# Patient Record
Sex: FEMALE | Race: WHITE | Employment: OTHER | URBAN - METROPOLITAN AREA
[De-identification: names, ages, dates, MRNs, and addresses within clinical notes are randomized per-mention and may not be internally consistent; named-entity substitution may affect disease eponyms.]

---

## 2022-04-27 ENCOUNTER — APPOINTMENT (OUTPATIENT)
Dept: CT IMAGING | Age: 57
DRG: 057 | End: 2022-04-27
Attending: EMERGENCY MEDICINE
Payer: COMMERCIAL

## 2022-04-27 ENCOUNTER — HOSPITAL ENCOUNTER (INPATIENT)
Age: 57
LOS: 1 days | Discharge: HOME | DRG: 057 | End: 2022-04-27
Attending: EMERGENCY MEDICINE | Admitting: INTERNAL MEDICINE
Payer: COMMERCIAL

## 2022-04-27 VITALS
HEART RATE: 108 BPM | RESPIRATION RATE: 18 BRPM | SYSTOLIC BLOOD PRESSURE: 161 MMHG | DIASTOLIC BLOOD PRESSURE: 68 MMHG | OXYGEN SATURATION: 99 %

## 2022-04-27 DIAGNOSIS — Z86.79 HISTORY OF ATRIAL FIBRILLATION: ICD-10-CM

## 2022-04-27 DIAGNOSIS — R51.9 ACUTE NONINTRACTABLE HEADACHE, UNSPECIFIED HEADACHE TYPE: ICD-10-CM

## 2022-04-27 DIAGNOSIS — R29.898 WEAKNESS OF EXTREMITY: ICD-10-CM

## 2022-04-27 DIAGNOSIS — Z86.2 HISTORY OF LUPUS ANTICOAGULANT DISORDER: ICD-10-CM

## 2022-04-27 DIAGNOSIS — R42 VERTIGO: Primary | ICD-10-CM

## 2022-04-27 PROBLEM — R53.1 RIGHT SIDED WEAKNESS: Status: ACTIVE | Noted: 2022-04-27

## 2022-04-27 LAB
ALBUMIN SERPL-MCNC: 3.9 G/DL (ref 3.4–5)
ALBUMIN/GLOB SERPL: 1 {RATIO} (ref 0.8–1.7)
ALP SERPL-CCNC: 226 U/L (ref 45–117)
ALT SERPL-CCNC: 84 U/L (ref 13–56)
AMMONIA PLAS-SCNC: 13 UMOL/L (ref 11–32)
AMPHET UR QL SCN: NEGATIVE
ANION GAP SERPL CALC-SCNC: 10 MMOL/L (ref 3–18)
APPEARANCE UR: CLEAR
AST SERPL-CCNC: 40 U/L (ref 10–38)
BARBITURATES UR QL SCN: NEGATIVE
BASOPHILS # BLD: 0 K/UL (ref 0–0.1)
BASOPHILS NFR BLD: 0 % (ref 0–2)
BENZODIAZ UR QL: NEGATIVE
BILIRUB DIRECT SERPL-MCNC: 0.2 MG/DL (ref 0–0.2)
BILIRUB SERPL-MCNC: 0.6 MG/DL (ref 0.2–1)
BILIRUB UR QL: NEGATIVE
BUN SERPL-MCNC: 13 MG/DL (ref 7–18)
BUN/CREAT SERPL: 16 (ref 12–20)
CALCIUM SERPL-MCNC: 9.7 MG/DL (ref 8.5–10.1)
CANNABINOIDS UR QL SCN: NEGATIVE
CHLORIDE SERPL-SCNC: 102 MMOL/L (ref 100–111)
CO2 SERPL-SCNC: 26 MMOL/L (ref 21–32)
COCAINE UR QL SCN: NEGATIVE
COLOR UR: YELLOW
CREAT SERPL-MCNC: 0.83 MG/DL (ref 0.6–1.3)
DIFFERENTIAL METHOD BLD: ABNORMAL
EOSINOPHIL # BLD: 0 K/UL (ref 0–0.4)
EOSINOPHIL NFR BLD: 0 % (ref 0–5)
ERYTHROCYTE [DISTWIDTH] IN BLOOD BY AUTOMATED COUNT: 13.1 % (ref 11.6–14.5)
ETHANOL SERPL-MCNC: 6 MG/DL (ref 0–3)
GLOBULIN SER CALC-MCNC: 3.9 G/DL (ref 2–4)
GLUCOSE BLD STRIP.AUTO-MCNC: 306 MG/DL (ref 70–110)
GLUCOSE SERPL-MCNC: 335 MG/DL (ref 74–99)
GLUCOSE UR STRIP.AUTO-MCNC: >1000 MG/DL
HCT VFR BLD AUTO: 50.8 % (ref 35–45)
HDSCOM,HDSCOM: NORMAL
HGB BLD-MCNC: 17 G/DL (ref 12–16)
HGB UR QL STRIP: NEGATIVE
IMM GRANULOCYTES # BLD AUTO: 0.1 K/UL (ref 0–0.04)
IMM GRANULOCYTES NFR BLD AUTO: 1 % (ref 0–0.5)
INR PPP: 1 (ref 0.8–1.2)
KETONES UR QL STRIP.AUTO: ABNORMAL MG/DL
LEUKOCYTE ESTERASE UR QL STRIP.AUTO: NEGATIVE
LYMPHOCYTES # BLD: 1.5 K/UL (ref 0.9–3.6)
LYMPHOCYTES NFR BLD: 17 % (ref 21–52)
MCH RBC QN AUTO: 29 PG (ref 24–34)
MCHC RBC AUTO-ENTMCNC: 33.5 G/DL (ref 31–37)
MCV RBC AUTO: 86.7 FL (ref 78–100)
METHADONE UR QL: NEGATIVE
MONOCYTES # BLD: 0.5 K/UL (ref 0.05–1.2)
MONOCYTES NFR BLD: 5 % (ref 3–10)
NEUTS SEG # BLD: 6.8 K/UL (ref 1.8–8)
NEUTS SEG NFR BLD: 77 % (ref 40–73)
NITRITE UR QL STRIP.AUTO: NEGATIVE
NRBC # BLD: 0 K/UL (ref 0–0.01)
NRBC BLD-RTO: 0 PER 100 WBC
OPIATES UR QL: NEGATIVE
PCP UR QL: NEGATIVE
PH UR STRIP: 5 [PH] (ref 5–8)
PLATELET # BLD AUTO: 230 K/UL (ref 135–420)
PMV BLD AUTO: 11 FL (ref 9.2–11.8)
POTASSIUM SERPL-SCNC: 3.6 MMOL/L (ref 3.5–5.5)
PROT SERPL-MCNC: 7.8 G/DL (ref 6.4–8.2)
PROT UR STRIP-MCNC: NEGATIVE MG/DL
PROTHROMBIN TIME: 13.4 SEC (ref 11.5–15.2)
RBC # BLD AUTO: 5.86 M/UL (ref 4.2–5.3)
SODIUM SERPL-SCNC: 138 MMOL/L (ref 136–145)
SP GR UR REFRACTOMETRY: >1.03 (ref 1–1.03)
TROPONIN-HIGH SENSITIVITY: 5 NG/L (ref 0–54)
TROPONIN-HIGH SENSITIVITY: 5 NG/L (ref 0–54)
UROBILINOGEN UR QL STRIP.AUTO: 1 EU/DL (ref 0.2–1)
WBC # BLD AUTO: 8.8 K/UL (ref 4.6–13.2)

## 2022-04-27 PROCEDURE — 74011250636 HC RX REV CODE- 250/636: Performed by: EMERGENCY MEDICINE

## 2022-04-27 PROCEDURE — 65270000046 HC RM TELEMETRY

## 2022-04-27 PROCEDURE — 70496 CT ANGIOGRAPHY HEAD: CPT

## 2022-04-27 PROCEDURE — 81003 URINALYSIS AUTO W/O SCOPE: CPT

## 2022-04-27 PROCEDURE — 80307 DRUG TEST PRSMV CHEM ANLYZR: CPT

## 2022-04-27 PROCEDURE — 93005 ELECTROCARDIOGRAM TRACING: CPT

## 2022-04-27 PROCEDURE — 82077 ASSAY SPEC XCP UR&BREATH IA: CPT

## 2022-04-27 PROCEDURE — 80048 BASIC METABOLIC PNL TOTAL CA: CPT

## 2022-04-27 PROCEDURE — 85025 COMPLETE CBC W/AUTO DIFF WBC: CPT

## 2022-04-27 PROCEDURE — 99285 EMERGENCY DEPT VISIT HI MDM: CPT

## 2022-04-27 PROCEDURE — 70450 CT HEAD/BRAIN W/O DYE: CPT

## 2022-04-27 PROCEDURE — 74011250637 HC RX REV CODE- 250/637: Performed by: EMERGENCY MEDICINE

## 2022-04-27 PROCEDURE — 96360 HYDRATION IV INFUSION INIT: CPT

## 2022-04-27 PROCEDURE — 80076 HEPATIC FUNCTION PANEL: CPT

## 2022-04-27 PROCEDURE — 84484 ASSAY OF TROPONIN QUANT: CPT

## 2022-04-27 PROCEDURE — 82962 GLUCOSE BLOOD TEST: CPT

## 2022-04-27 PROCEDURE — 85610 PROTHROMBIN TIME: CPT

## 2022-04-27 PROCEDURE — 74011000636 HC RX REV CODE- 636: Performed by: EMERGENCY MEDICINE

## 2022-04-27 PROCEDURE — 82140 ASSAY OF AMMONIA: CPT

## 2022-04-27 RX ORDER — DIPHENHYDRAMINE HYDROCHLORIDE 50 MG/ML
25 INJECTION, SOLUTION INTRAMUSCULAR; INTRAVENOUS ONCE
Status: COMPLETED | OUTPATIENT
Start: 2022-04-27 | End: 2022-04-27

## 2022-04-27 RX ORDER — METOCLOPRAMIDE HYDROCHLORIDE 5 MG/ML
5 INJECTION INTRAMUSCULAR; INTRAVENOUS ONCE
Status: COMPLETED | OUTPATIENT
Start: 2022-04-27 | End: 2022-04-27

## 2022-04-27 RX ORDER — MECLIZINE HYDROCHLORIDE 25 MG/1
25 TABLET ORAL
Qty: 20 TABLET | Refills: 0 | Status: SHIPPED | OUTPATIENT
Start: 2022-04-27 | End: 2022-05-04

## 2022-04-27 RX ORDER — MECLIZINE HCL 12.5 MG 12.5 MG/1
25 TABLET ORAL
Status: COMPLETED | OUTPATIENT
Start: 2022-04-27 | End: 2022-04-27

## 2022-04-27 RX ORDER — GUAIFENESIN 100 MG/5ML
324 LIQUID (ML) ORAL
Status: COMPLETED | OUTPATIENT
Start: 2022-04-27 | End: 2022-04-27

## 2022-04-27 RX ADMIN — METOCLOPRAMIDE HYDROCHLORIDE 5 MG: 5 INJECTION INTRAMUSCULAR; INTRAVENOUS at 21:55

## 2022-04-27 RX ADMIN — SODIUM CHLORIDE 1000 ML: 900 INJECTION, SOLUTION INTRAVENOUS at 19:52

## 2022-04-27 RX ADMIN — IOPAMIDOL 80 ML: 755 INJECTION, SOLUTION INTRAVENOUS at 19:02

## 2022-04-27 RX ADMIN — DIPHENHYDRAMINE HYDROCHLORIDE 25 MG: 50 INJECTION, SOLUTION INTRAMUSCULAR; INTRAVENOUS at 21:56

## 2022-04-27 RX ADMIN — MECLIZINE 25 MG: 12.5 TABLET ORAL at 21:55

## 2022-04-27 RX ADMIN — ASPIRIN 81 MG 324 MG: 81 TABLET ORAL at 21:55

## 2022-04-27 NOTE — Clinical Note
Status[de-identified] INPATIENT [101]   Type of Bed: Telemetry [19]   Cardiac Monitoring Required?: Yes   Inpatient Hospitalization Certified Necessary for the Following Reasons: 3.  Patient receiving treatment that can only be provided in an inpatient setting (further clarification in H&P documentation)   Admitting Diagnosis: Right sided weakness [2596533]   Admitting Diagnosis: Vertigo [755641]   Admitting Diagnosis: Headache [[id number]]   Admitting Diagnosis: History of lupus anticoagulant disorder [4421197]   Admitting Diagnosis: History of atrial fibrillation [3069798]   Admitting Physician: Teagan Montano   Attending Physician: Teagan Montano   Estimated Length of Stay: 2 Midnights   Discharge Plan[de-identified] 2003 [facility]

## 2022-04-27 NOTE — ED PROVIDER NOTES
EMERGENCY DEPARTMENT HISTORY AND PHYSICAL EXAM    6:50 PM    Date: 4/27/2022  Patient Name: Lor Kent    History of Presenting Illness     Chief Complaint   Patient presents with    Extremity Weakness       History Provided By: Patient  Location/Duration/Severity/Modifying factors   Patient is a 50-year-old female with past medical history of atrial fibrillation, previous stroke, diabetes, full past medical history limited due to the acuity of condition and no access to outside records at this time. She presents with complaints of right-sided facial arm and leg weakness patient reportedly onset 1 hour ago. The patient reports she had 2 strokes in the past, most recently was at least 5 or 6 years ago. She denies any chest pain or shortness of breath. History limited due to the acuity of condition and rapidity of assessment. Patient indicates that she initially felt \"off\", prior to developing the focal weakness on the right side. She mentioned it to her daughter And her daughter's  who is reportedly a physician and they thought possibly it was related to dehydration. After realizing the focal weakness on the right side they recommend the patient come to be evaluated. Patient reports compliance with her Eliquis, she has a history of A. fib.           PCP: None        Past History     Past Medical History:  Past Medical History:   Diagnosis Date    Atrial fibrillation ([address]     Claustrophobia 04/27/2022    Patient requesting Full Sedation for MRI    CVA (cerebral vascular accident) ([address]     Diabetes mellitus, type 2 ([address]     Family history of early CAD     Father at 40, Brother at 44    GERD (gastroesophageal reflux disease)     Goiter     H/O lupus anticoagulant disorder     HLD (hyperlipidemia)     HTN (hypertension)     Migraines     Reportedly without Aura    THOMPSON (nonalcoholic steatohepatitis)     Sjogren's syndrome ([address]        Past Surgical History:  Past Surgical History:   Procedure Laterality Date    HX AFIB ABLATION  2014    Cardiac Ablation and Loop Recorder Implantation    HX HYSTERECTOMY      HX OTHER SURGICAL      Breast Sx    IR BX LIVER PERCUTANEOUS         Family History:  Family History   Problem Relation Age of Onset    COPD Mother     Coronary Art Dis Father 40        Early CAD   [place] Stroke Sister     Coronary Art Dis Sister     Stroke Brother     Coronary Art Dis Brother 44        Early CAD       Social History:  Social History     Tobacco Use    Smoking status: Not on file    Smokeless tobacco: Not on file   Substance Use Topics    Alcohol use: Not Currently    Drug use: Not on file       Allergies: Allergies   Allergen Reactions    Coumadin [Warfarin] Unknown (comments)    Lovenox [Enoxaparin] Rash    Xarelto [Rivaroxaban] Unknown (comments)       I reviewed and confirmed the above information with patient and updated as necessary. Review of Systems     Review of Systems   Constitutional: Negative for chills and fever. HENT: Negative for congestion, rhinorrhea, sinus pressure and sneezing. Eyes: Negative for visual disturbance. Respiratory: Negative for cough and shortness of breath. Cardiovascular: Negative for chest pain. Gastrointestinal: Negative for abdominal pain, diarrhea, nausea and vomiting. Genitourinary: Negative for dysuria, frequency and urgency. Musculoskeletal: Negative for back pain and neck pain. Skin: Negative for rash. Neurological: Positive for dizziness, weakness, numbness and headaches. Negative for syncope. Physical Exam     Visit Vitals  BP (!) 161/68   Pulse (!) 108   Resp 18   SpO2 99%       Physical Exam  Vitals and nursing note reviewed. Constitutional:       General: She is not in acute distress. Appearance: Normal appearance. She is obese. HENT:      Head: Normocephalic and atraumatic.       Right Ear: External ear normal.      Left Ear: External ear normal.      Nose: Nose normal.      Mouth/Throat:      Mouth: Mucous membranes are moist.   Eyes:      Conjunctiva/sclera: Conjunctivae normal.      Pupils: Pupils are equal, round, and reactive to light. Comments: No vertical or rotatory nystagmus   Cardiovascular:      Rate and Rhythm: Normal rate and regular rhythm. Pulses: Normal pulses. Heart sounds: Normal heart sounds. No murmur heard. Pulmonary:      Effort: Pulmonary effort is normal.      Breath sounds: Normal breath sounds. No wheezing, rhonchi or rales. Abdominal:      General: Abdomen is flat. Tenderness: There is no abdominal tenderness. There is no guarding or rebound. Musculoskeletal:         General: No swelling or tenderness. Normal range of motion. Cervical back: Normal range of motion and neck supple. Right lower leg: No edema. Left lower leg: No edema. Skin:     General: Skin is warm and dry. Capillary Refill: Capillary refill takes less than 2 seconds. Findings: No rash. Neurological:      Mental Status: She is alert. Comments: Cranial nerves II through XII are intact. Face looks mildly asymmetric although she has symmetric smile and preserved strength with a midline tongue protrusion. She has no objective loss of sensation or strength in the upper or lower extremities. Diagnostic Study Results     Labs -  No results found for this or any previous visit (from the past 24 hour(s)). Radiologic Studies -   CTA HEAD NECK W CONT   Final Result   1. Patent intracranial brain arteries. No LVO. 2. No significant stenosis in cervical carotid or vertebral arteries. 3. Short segment of irregular contours in the cervical left ICA below the skull   base. No luminal narrowing, but the beaded appearance could represent   fibromuscular dysplasia or possibly a chronic dissection.  The right cervical ICA   contours are normal.          CT HEAD WO CONT   Final Result   Addendum 1 of 1   Addendum: Wet read provided via facilitator to Dr. Michelle Ovalles at 18:59. Final                  No acute intracranial abnormalities. Medical Decision Making   I am the first provider for this patient. I reviewed the vital signs, available nursing notes, past medical history, past surgical history, family history and social history. Vital Signs-Reviewed the patient's vital signs. EKG: Normal sinus rhythm, rate of 95. Normal VT, QRS and QTc intervals. Normal axis. No ST segment elevation or depression. Overall normal sinus rhythm and normal EKG. Records Reviewed: Nursing Notes and Old Medical Records (Time of Review: 6:50 PM)      Provider Notes (Medical Decision Making):   MDM  Number of Diagnoses or Management Options  Acute nonintractable headache, unspecified headache type  History of atrial fibrillation  History of lupus anticoagulant disorder  Vertigo  Weakness of extremity  Diagnosis management comments: 24-year-old female who presents to the emergency room with a chief complaint of dizziness, headache and right-sided primarily objective weakness. Exam she does not have any objective neurologic findings, her face looks mildly asymmetric although she does not really have any loss of strength in her cranial nerves on formal testing. Patient's daughter does not feel that her face looks different than usual.    Patient was called down as acute stroke, given time of onset within 24 hours of arrival, patient not a tPA candidate due to her Eliquis which she has been taking and patient excluded from alteplase for that reason. Teleneurology evaluated the patient thought this is more likely vertigo than stroke, they requested MRI be performed.     DX: Acute stroke, vertigo, migraine, functional neurologic symptom disorder, lupus, dural sinus thrombosis, intracranial hemorrhage, cerebellar stroke, BPPV, etc.    Telemetry neurologist who saw the patient requested that MRI be done to rule out stroke, felt was more likely BPPV. Advised that if MRI was negative basically follow closely can be discharged. The patient was adamant that she could not have MRI performed without being fully sedated. Unfortunately do not have the capability here to fully sedate the patient for MRI. Given limitations of single coverage facility with freestanding emergency room capabilities we cannot sedate the patient, more than anxiolysis. Which the patient indicated would not be acceptable for her. I recommended in that case the patient to be admitted for sedation MRI or serial exams and serial CT scans to rule out stroke. Patient initially agreeable however became opposed to admission. I offered that we could do a lateral transfer to an alternative facility if she was going to go to [facility]. She took some time to think about it although ultimately declined admission, she would rather do outpatient follow-up. Patient was feeling somewhat better. I will prescribe her meclizine, discussed with her the reasoning and rationale for admission which would include serial exams, neurologic evaluation, possibly sedation MRI and risk factor modification for stroke was discovered. We are able to rule out large vessel occlusion, although additionally an echocardiogram would typically be recommended to be performed as well as assessment of her anticoagulated status. She does not currently have any outpatient PCP coverage. She indicates that she would like resources on give her as much as we can reasonably give her here in the ER, it is late in the evening most offices are closed however advised her to call tomorrow morning first thing I will give her neurology follow-up. Evidently her son-in-law is also physician. I encouraged the patient to return if worsening in any way in the meantime or if she has recurrent symptoms or any worsening in her condition in the meantime or if she reconsiders.     This note is dictated utilizing Jamm[employer] voice recognition software. Unfortunately this leads to occasional typographical errors using the voice recognition. I apologize in advance if the situation occurs. If questions occur please do not hesitate to contact me directly. Dee Avalos DO            ED Course: Progress Notes, Reevaluation, and Consults:  ED Course as of 04/29/22 1221   Wed Apr 27, 2022   1847 This with teleneurology, they will evaluate the patient upon return from CT scan. [DRAKE]   1945 Discussed with teleneurology, thought possibly related to vertigo, but given the patient's risk factors with A. fib, reported weakness, suggested MRI and stroke work-up. Unfortunately patient is too claustrophobic to get an MRI here without full sedation we do not have the capability to do a full sedation in our MRI suite at our freestanding emergency room. [DRAKE]   2155 After I spoke to the hospitalist to arrange for admission for the patient and she indicated that she decided not to be admitted. I discussed with her the expectations for what would happen while admitted including respect modification, MRI with sedation, as well as echocardiogram, neurology evaluation and cardiac evaluation as well as optimization of anticoagulation. The patient acknowledges this and her daughter they would prefer to go follow-up outpatient. I do not think this is incredibly unreasonable, as patient's symptoms are primarily subjective, rapidly improving and teleneurology felt like this was possibly even more likely vertigo than ischemic stroke. Patient advised to follow closely with outpatient resources. Patient was previously taking Xigduo for diabetes. I offered to represcribe to her however she indicates she has some at home and will resume taking them. [DRAKE]   6452 Preliminary results of no LVO on CTA head and neck although no full interpretation.  [DRAKE]      ED Course User Index  [DRAKE] Savi Bone DO       Procedures    Critical Care Time: CRITICAL CARE NOTE:    I have spent 38 minutes of critical care time involved in lab review, consultations with specialist, family decision-making, and documentation. During this entire length of time I was immediately available to the patient. Critical Care: The reason for providing this level of medical care for this critically ill patient was due a critical illness that impaired one or more vital organ systems such that there was a high probability of imminent or life threatening deterioration in the patients condition. This care involved high complexity decision making to assess, manipulate, and support vital system functions, to treat this vital organ system failure and to prevent further life threatening deterioration of the patients condition. Time is exclusive of procedural and teaching time. Arnoldo Ramsay DO      Diagnosis     Clinical Impression:   1. Vertigo    2. Acute nonintractable headache, unspecified headache type    3. History of atrial fibrillation    4. History of lupus anticoagulant disorder    5.  Weakness of extremity        Disposition: Discharged      Follow-up Information     Follow up With Specialties Details Why Contact Info    Gee Padilla MD Neurology Call in 2 days Neurology Follow up [address] [facility]      Perla Mcclellan MD Neurology Call in 1 day Neurology Follow up [address]  [phone]      [facility]  In 3 days [address]    Norris Kinsey MD Family Medicine  PCP [address] [place] [address]  [phone]      [facility] HOSP AT [facility] Family Medicine   Primary Care Resource [address] [facility]  Floor 3  [address]  In 3 days Primary Care Resource [facility] Jeanna Omalley MD Internal Medicine In 3 days PCP resource [address] [facility]  [phone]      Marques Gil MD Family Medicine Call in 1 day  [facility] [address] [phone]      Billy Durán MD Neurology Call in 1 day Neurology Follow up [address]  [phone]      Fab Palumbo MD Cardiology, Internal Medicine, Nuclear Medicine Call today Cardiology [facility]  [address] [address] [facility]      Karely Balderas MD Cardiology, Internal Medicine Call today Cardiology [address] [facility] [address]      Eleanor Javed MD Internal Medicine  PCP [address] [facility] [address]  [phone]      Nabeel Do MD Cardiology Call today Cardiology [address] [facility] Floor [address] [facility] [address]             Discharge Medication List as of 4/27/2022 10:09 PM      START taking these medications    Details   meclizine (ANTIVERT) 25 mg tablet Take 1 Tablet by mouth three (3) times daily as needed for Dizziness for up to 7 days. , Normal, Disp-20 Tablet, R-0             [address] [facility]   Emergency Medicine   April 29, 2022, 6:50 PM     This note is dictated utilizing Dragon voice recognition software. Unfortunately this leads to occasional typographical errors using the voice recognition. I apologize in advance if the situation occurs. If questions occur please do not hesitate to contact me directly. Patient was seen  and treated during the context of the COVID-19 pandemic. Contemporary protocols utilized based on the best available evidence, utilizing evolving public health  guidelines and treatment protocols.     Monica Able, DO]

## 2022-04-27 NOTE — ED TRIAGE NOTES
Pt c/o of right sided weakness. Pt states she was last normal at 1730 today. Pt states she has a history of stroke & afib & is taking eliquis.

## 2022-04-28 LAB
ATRIAL RATE: 95 BPM
CALCULATED P AXIS, ECG09: 35 DEGREES
CALCULATED R AXIS, ECG10: -3 DEGREES
CALCULATED T AXIS, ECG11: 31 DEGREES
DIAGNOSIS, 93000: NORMAL
P-R INTERVAL, ECG05: 204 MS
Q-T INTERVAL, ECG07: 368 MS
QRS DURATION, ECG06: 86 MS
QTC CALCULATION (BEZET), ECG08: 462 MS
VENTRICULAR RATE, ECG03: 95 BPM

## 2022-04-28 NOTE — ED NOTES
IVs removed with catheter intact. Discharge instructions and prescription reviewed with patient and understanding verbalized. Patient exits through waiting area.

## 2022-04-28 NOTE — PROGRESS NOTES
INTERIM UPDATE - 2047 EST on 4/27/2022    [facility] ER Physician calls requesting admission for a Patient with Right-Sided Weakness. Patient presented with 1-hour of right-sided weakness but was found to be ineligible for tPA, as Patient is on Apixaban. CT and CTA are negative for significant, acute findings. Patient reports that she has had a previous CVA with right-sided deficits, stating that her previous CVA was worse in terms of symptoms than this instance. Patient also reports that she is Claustrophobic and will require total sedation for an MRI to be performed. Tele-Neurology recommends work-up for TIA/CVA, which is very reasonable. Patient is otherwise stable. Plan:  Admit Patient to [facility] Telemetry Unit for management of TIA/CVA versus Recrudescence of Previous CVA. Admitted Patients' Charts are reviewed within 24 hours and if there is insufficient clinical justification to warrant Full Admission for a Patient, they will be appropriately downgraded to Observation status at that time.

## 2022-04-28 NOTE — ED NOTES
09:50 PM  I informed the pt that she needed admission and further workup for adequate evaluation for her  right sided weakness   and that by refusing the above, she is at risk for sudden death, paralysis, and further deterioration  She is awake, alert, and she understands her condition and the risks involved in leaving. The patient has received no sedating medications at the time of the discussion. She is clinically aware of her surroundings and able to ask appropriate questions, the patients relative and the nurse present confirms she is not clinically intoxicated and able to make medical decisions. She verbalized knowing the risks and understood it was recommended that she stay and could also return at any time. The patient's relative was present for the discussion and also verbalized that they understood both diagnosis, risks and could return at any time. They were both provided with warnings regarding worsening of her condition and were provided instructions to follow up with None tomorrow or return to the Emergency Room as soon as possible. This discussion was witnessed by nurse Kristin Vaz.   Leno Keller DO

## 2022-04-28 NOTE — DISCHARGE INSTRUCTIONS
1.  It is important to follow-up with one of the attached resources. You may also find resources of your choosing. You should follow-up with a primary care doctor, cardiologist and a neurologist primarily. 2.  You should not take your medications as prescribed. 3.  If you experience worsening, or reconsider, you should return or seek medical care as soon as possible.

## 2022-04-29 NOTE — ED PROVIDER NOTES
COMP EXAM, FMX, PROBE EXAM  Reviewed med history, meds, allergies in Epic  New patient visit  Last dental visit approximately 4 years ago- last dental visit was at this office in 2018-( previous last name LONG)  CHIEF CONCERN: pt reports no pain  Pt has area UL bleeding gum  PAIN SCALE: 0  ASA CLASS: I  PLAQUE: mild   CALCULUS: mod calculus  BLEEDING: slight BUP   STAIN :none  ORAL HYGIENE:   fair  PERIO: full probe exam completed  2-4 mm present  Moderate calculus molars    Soft tissue exam:  soft tissue exam was normal  ExtraOral exam:   Extraoral exam was normal    Dr Casandra Aguirre exam=   Reviewed with patient clinical and radiographic findings and patient verbalized understanding  All questions and concerns addressed  Pt informed of numerous caries  Should pt decay on radiographs  Dr recommended adult prophy  Some early bone loss present/ most in lower anterior       REFERRALS: no referrals needed    CARIES FINDINGS: #2-O, #3-MO, #5 -DO, #12- DO, #13-MOD,  #18-MO,  #19- , #20-MOD, #29- DO, #30- DO#31- MOD,     Next Visit: 45 min Adult prophy  Next Visit:  60 min fillings with adult       Next Recall: 6 month recall     Last  FMX : 3/23/22 EMERGENCY DEPARTMENT HISTORY AND PHYSICAL EXAM    6:50 PM    Date: 4/27/2022  Patient Name: Yi Polanco    History of Presenting Illness     No chief complaint on file. History Provided By: Patient  Location/Duration/Severity/Modifying factors   Patient is a 65-year-old female with past medical history of atrial fibrillation, previous stroke, diabetes, full past medical history limited due to the acuity of condition and no access to outside records at this time. She presents with complaints of right-sided facial arm and leg weakness patient reportedly onset 1 hour ago. The patient reports she had 2 strokes in the past, most recently was at least 5 or 6 years ago. She denies any chest pain or shortness of breath. History limited due to the acuity of condition and rapidity of assessment. Patient indicates that she initially felt \"off\", prior to developing the focal weakness on the right side. She mentioned it to her daughter And her daughter's  who is reportedly a physician and they thought possibly it was related to dehydration. After realizing the focal weakness on the right side they recommend the patient come to be evaluated. Patient reports compliance with her Eliquis, she has a history of A. fib. PCP: No primary care provider on file. Past History     Past Medical History:  No past medical history on file. Past Surgical History:  No past surgical history on file. Family History:  No family history on file. Social History:  Social History     Tobacco Use    Smoking status: Not on file    Smokeless tobacco: Not on file   Substance Use Topics    Alcohol use: Not on file    Drug use: Not on file       Allergies:  Not on File    I reviewed and confirmed the above information with patient and updated as necessary. Review of Systems     Review of Systems   Constitutional: Negative for chills and fever.    HENT: Negative for congestion, rhinorrhea, sinus pressure and sneezing. Eyes: Negative for visual disturbance. Respiratory: Negative for cough and shortness of breath. Cardiovascular: Negative for chest pain. Gastrointestinal: Negative for abdominal pain, diarrhea, nausea and vomiting. Genitourinary: Negative for dysuria, frequency and urgency. Musculoskeletal: Negative for back pain and neck pain. Skin: Negative for rash. Neurological: Positive for dizziness, weakness, numbness and headaches. Negative for syncope and light-headedness. Physical Exam   There were no vitals taken for this visit. Physical Exam  Vitals and nursing note reviewed. Constitutional:       General: She is not in acute distress. Appearance: Normal appearance. She is normal weight. HENT:      Head: Normocephalic and atraumatic. Comments: Face slightly asymmetric although patient has a symmetric smile and midline tongue protrusion, there is no facial numbness or apparent weakness of facial muscles     Right Ear: External ear normal.      Left Ear: External ear normal.      Nose: Nose normal.      Mouth/Throat:      Mouth: Mucous membranes are moist.      Pharynx: Oropharynx is clear. No oropharyngeal exudate or posterior oropharyngeal erythema. Eyes:      Conjunctiva/sclera: Conjunctivae normal.   Cardiovascular:      Rate and Rhythm: Normal rate and regular rhythm. Pulses: Normal pulses. Heart sounds: Normal heart sounds. No murmur heard. Pulmonary:      Effort: Pulmonary effort is normal.      Breath sounds: Normal breath sounds. No wheezing, rhonchi or rales. Abdominal:      General: Abdomen is flat. Tenderness: There is no abdominal tenderness. There is no guarding or rebound. Musculoskeletal:         General: No swelling or tenderness. Normal range of motion. Cervical back: Normal range of motion and neck supple. Right lower leg: No edema. Left lower leg: No edema. Skin:     General: Skin is warm and dry. Capillary Refill: Capillary refill takes less than 2 seconds. Findings: No rash. Neurological:      Mental Status: She is alert. Comments: Patient has no objective loss of sensation on upper or lower extremities. Her cranial nerves II through XII are grossly intact without deficits noted. She has no pronator drift, she has no loss of strength in the lower extremities. Finger-to-nose is grossly intact with normal coordination. Diagnostic Study Results     Labs -  Recent Results (from the past 24 hour(s))   GLUCOSE, POC    Collection Time: 04/27/22  6:39 PM   Result Value Ref Range    Glucose (POC) 306 (H) 70 - 110 mg/dL         Radiologic Studies -   CT CODE NEURO HEAD WO CONTRAST    (Results Pending)   CTA HEAD NECK W WO CONT    (Results Pending)   CT HEAD WO CONT    (Results Pending)           Medical Decision Making   I am the first provider for this patient. I reviewed the vital signs, available nursing notes, past medical history, past surgical history, family history and social history. Vital Signs-Reviewed the patient's vital signs. EKG: ***    Records Reviewed: {CDIRECORDSREVIEWED:70228} (Time of Review: 6:50 PM)      Provider Notes (Medical Decision Making):   MDM  Number of Diagnoses or Management Options  Acute nonintractable headache, unspecified headache type  History of atrial fibrillation  History of lupus anticoagulant disorder  Vertigo  Weakness of extremity  Diagnosis management comments: Patient is a 76-year-old female who presents to the emergency room with a chief complaint of a headache, occipital nature as well as room spinning dizziness, and the reports of subjective right-sided weakness which she describes as \"numb and shaky\". Reports that she has had weakness and numbness on the right side periodically in the past although has not always sought medical treatment for it.     Patient was called out as an acute stroke activation given onset of symptoms within 24 hours, just over an hour prior to arrival here. Initially started with dizziness and headache and then noticed the right-sided symptoms. ED Course: Progress Notes, Reevaluation, and Consults:  ED Course as of 04/29/22 1205   Wed Apr 27, 2022   1847 This with teleneurology, they will evaluate the patient upon return from CT scan. [DRAKE]   1945 Discussed with teleneurology, thought possibly related to vertigo, but given the patient's risk factors with A. fib, reported weakness, suggested MRI and stroke work-up. Unfortunately patient is too claustrophobic to get an MRI here without full sedation we do not have the capability to do a full sedation in our MRI suite at our freestanding emergency room. [DRAKE]   7665 After I spoke to the hospitalist to arrange for admission for the patient and she indicated that she decided not to be admitted. I discussed with her the expectations for what would happen while admitted including respect modification, MRI with sedation, as well as echocardiogram, neurology evaluation and cardiac evaluation as well as optimization of anticoagulation. The patient acknowledges this and her daughter they would prefer to go follow-up outpatient. I do not think this is incredibly unreasonable, as patient's symptoms are primarily subjective, rapidly improving and teleneurology felt like this was possibly even more likely vertigo than ischemic stroke. Patient advised to follow closely with outpatient resources. Patient was previously taking Xigduo for diabetes. I offered to represcribe to her however she indicates she has some at home and will resume taking them. [DRAKE]   0478 Preliminary results of no LVO on CTA head and neck although no full interpretation. [DRAKE]      ED Course User Index  [DRAKE] Luis Miguel Pennington DO       Procedures    Critical Care Time: ***    Diagnosis     Clinical Impression: No diagnosis found.     Disposition: ***    Follow-up Information    None Patient's Medications    No medications on file       Dev Quintero DO   Emergency Medicine   April 27, 2022, 6:50 PM     This note is dictated utilizing Dragon voice recognition software. Unfortunately this leads to occasional typographical errors using the voice recognition. I apologize in advance if the situation occurs. If questions occur please do not hesitate to contact me directly. Patient was seen  and treated during the context of the COVID-19 pandemic. Contemporary protocols utilized based on the best available evidence, utilizing evolving public health  guidelines and treatment protocols.     Dev Quintero DO]

## 2022-07-26 ENCOUNTER — HOSPITAL ENCOUNTER (OUTPATIENT)
Dept: LAB | Age: 57
Discharge: HOME | End: 2022-07-26
Payer: COMMERCIAL

## 2022-07-26 DIAGNOSIS — R74.8 ELEVATED LIVER ENZYMES: ICD-10-CM

## 2022-07-26 DIAGNOSIS — Z11.59 ENCOUNTER FOR HEPATITIS C SCREENING TEST FOR LOW RISK PATIENT: ICD-10-CM

## 2022-07-26 DIAGNOSIS — R73.09 ELEVATED GLUCOSE LEVEL: ICD-10-CM

## 2022-07-26 DIAGNOSIS — E78.2 MIXED HYPERLIPIDEMIA: ICD-10-CM

## 2022-07-26 DIAGNOSIS — E78.2 MIXED HYPERLIPIDEMIA: Primary | ICD-10-CM

## 2022-07-26 LAB
ALBUMIN SERPL-MCNC: 3.9 G/DL (ref 3.4–5)
ALBUMIN/GLOB SERPL: 1.3 {RATIO} (ref 0.8–1.7)
ALP SERPL-CCNC: 208 U/L (ref 45–117)
ALT SERPL-CCNC: 59 U/L (ref 13–56)
ANION GAP SERPL CALC-SCNC: 9 MMOL/L (ref 3–18)
AST SERPL-CCNC: 31 U/L (ref 10–38)
BILIRUB SERPL-MCNC: 0.6 MG/DL (ref 0.2–1)
BUN SERPL-MCNC: 18 MG/DL (ref 7–18)
BUN/CREAT SERPL: 29 (ref 12–20)
CALCIUM SERPL-MCNC: 9.3 MG/DL (ref 8.5–10.1)
CHLORIDE SERPL-SCNC: 106 MMOL/L (ref 100–111)
CHOLEST SERPL-MCNC: 231 MG/DL
CO2 SERPL-SCNC: 25 MMOL/L (ref 21–32)
CREAT SERPL-MCNC: 0.63 MG/DL (ref 0.6–1.3)
EST. AVERAGE GLUCOSE BLD GHB EST-MCNC: 275 MG/DL
GLOBULIN SER CALC-MCNC: 3.1 G/DL (ref 2–4)
GLUCOSE SERPL-MCNC: 269 MG/DL (ref 74–99)
HBA1C MFR BLD: 11.2 % (ref 4.2–5.6)
HDLC SERPL-MCNC: 59 MG/DL (ref 40–60)
HDLC SERPL: 3.9 {RATIO} (ref 0–5)
LDLC SERPL CALC-MCNC: 153.8 MG/DL (ref 0–100)
LIPID PROFILE,FLP: ABNORMAL
POTASSIUM SERPL-SCNC: 4.1 MMOL/L (ref 3.5–5.5)
PROT SERPL-MCNC: 7 G/DL (ref 6.4–8.2)
SODIUM SERPL-SCNC: 140 MMOL/L (ref 136–145)
TRIGL SERPL-MCNC: 91 MG/DL (ref ?–150)
VLDLC SERPL CALC-MCNC: 18.2 MG/DL

## 2022-07-26 PROCEDURE — 36415 COLL VENOUS BLD VENIPUNCTURE: CPT

## 2022-07-26 PROCEDURE — 83036 HEMOGLOBIN GLYCOSYLATED A1C: CPT

## 2022-07-26 PROCEDURE — 80053 COMPREHEN METABOLIC PANEL: CPT

## 2022-07-26 PROCEDURE — 86803 HEPATITIS C AB TEST: CPT

## 2022-07-26 PROCEDURE — 80061 LIPID PANEL: CPT

## 2022-07-27 LAB
HCV AB SER IA-ACNC: 0.03 INDEX
HCV AB SERPL QL IA: NEGATIVE
HCV COMMENT,HCGAC: NORMAL

## 2022-07-28 ENCOUNTER — OFFICE VISIT (OUTPATIENT)
Dept: INTERNAL MEDICINE CLINIC | Age: 57
End: 2022-07-28
Payer: COMMERCIAL

## 2022-07-28 VITALS
WEIGHT: 254.8 LBS | BODY MASS INDEX: 43.5 KG/M2 | RESPIRATION RATE: 22 BRPM | TEMPERATURE: 97.2 F | DIASTOLIC BLOOD PRESSURE: 95 MMHG | SYSTOLIC BLOOD PRESSURE: 154 MMHG | HEIGHT: 64 IN | OXYGEN SATURATION: 94 % | HEART RATE: 85 BPM

## 2022-07-28 DIAGNOSIS — Z86.2 HISTORY OF LUPUS ANTICOAGULANT DISORDER: ICD-10-CM

## 2022-07-28 DIAGNOSIS — Z86.79 HISTORY OF ATRIAL FIBRILLATION: ICD-10-CM

## 2022-07-28 DIAGNOSIS — G43.919 INTRACTABLE MIGRAINE WITHOUT STATUS MIGRAINOSUS, UNSPECIFIED MIGRAINE TYPE: ICD-10-CM

## 2022-07-28 DIAGNOSIS — R73.09 HEMOGLOBIN A1C GREATER THAN 9.0%: ICD-10-CM

## 2022-07-28 DIAGNOSIS — Z76.89 ESTABLISHING CARE WITH NEW DOCTOR, ENCOUNTER FOR: ICD-10-CM

## 2022-07-28 DIAGNOSIS — Z13.89 OBESITY SCREEN: ICD-10-CM

## 2022-07-28 DIAGNOSIS — Z13.31 DEPRESSION SCREENING NEGATIVE: ICD-10-CM

## 2022-07-28 DIAGNOSIS — E66.01 CLASS 3 SEVERE OBESITY WITH BODY MASS INDEX (BMI) OF 40.0 TO 44.9 IN ADULT, UNSPECIFIED OBESITY TYPE, UNSPECIFIED WHETHER SERIOUS COMORBIDITY PRESENT (HCC): ICD-10-CM

## 2022-07-28 DIAGNOSIS — I10 PRIMARY HYPERTENSION: Primary | ICD-10-CM

## 2022-07-28 DIAGNOSIS — N89.8 VAGINAL ITCHING: ICD-10-CM

## 2022-07-28 DIAGNOSIS — E11.65 TYPE 2 DIABETES MELLITUS WITH HYPERGLYCEMIA, WITHOUT LONG-TERM CURRENT USE OF INSULIN (HCC): ICD-10-CM

## 2022-07-28 PROCEDURE — 99205 OFFICE O/P NEW HI 60 MIN: CPT | Performed by: STUDENT IN AN ORGANIZED HEALTH CARE EDUCATION/TRAINING PROGRAM

## 2022-07-28 PROCEDURE — 99401 PREV MED CNSL INDIV APPRX 15: CPT | Performed by: STUDENT IN AN ORGANIZED HEALTH CARE EDUCATION/TRAINING PROGRAM

## 2022-07-28 PROCEDURE — 96127 BRIEF EMOTIONAL/BEHAV ASSMT: CPT | Performed by: STUDENT IN AN ORGANIZED HEALTH CARE EDUCATION/TRAINING PROGRAM

## 2022-07-28 PROCEDURE — 3046F HEMOGLOBIN A1C LEVEL >9.0%: CPT | Performed by: STUDENT IN AN ORGANIZED HEALTH CARE EDUCATION/TRAINING PROGRAM

## 2022-07-28 RX ORDER — LANCETS
EACH MISCELLANEOUS
Qty: 1 EACH | Refills: 11 | Status: SHIPPED | OUTPATIENT
Start: 2022-07-28

## 2022-07-28 RX ORDER — IRBESARTAN 300 MG/1
1 TABLET ORAL DAILY
COMMUNITY
Start: 2021-12-02 | End: 2022-07-28 | Stop reason: SDUPTHER

## 2022-07-28 RX ORDER — IBUPROFEN 200 MG
CAPSULE ORAL
Qty: 100 STRIP | Refills: 3 | Status: SHIPPED | OUTPATIENT
Start: 2022-07-28

## 2022-07-28 RX ORDER — TOPIRAMATE 100 MG/1
TABLET, FILM COATED ORAL
COMMUNITY
End: 2022-07-28 | Stop reason: SDUPTHER

## 2022-07-28 RX ORDER — FLUCONAZOLE 150 MG/1
150 TABLET ORAL DAILY
Qty: 1 TABLET | Refills: 1 | Status: SHIPPED | OUTPATIENT
Start: 2022-07-28 | End: 2022-07-29 | Stop reason: SDUPTHER

## 2022-07-28 RX ORDER — INSULIN PUMP SYRINGE, 3 ML
EACH MISCELLANEOUS
Qty: 1 KIT | Refills: 0 | Status: SHIPPED | OUTPATIENT
Start: 2022-07-28

## 2022-07-28 RX ORDER — IRBESARTAN 300 MG/1
300 TABLET ORAL DAILY
Qty: 30 TABLET | Refills: 0 | Status: SHIPPED | OUTPATIENT
Start: 2022-07-28 | End: 2022-09-08 | Stop reason: SDUPTHER

## 2022-07-28 RX ORDER — TOPIRAMATE 100 MG/1
100 TABLET, FILM COATED ORAL DAILY
Qty: 30 TABLET | Refills: 0 | Status: SHIPPED | OUTPATIENT
Start: 2022-07-28 | End: 2022-09-08 | Stop reason: SDUPTHER

## 2022-07-28 NOTE — PROGRESS NOTES
HISTORY OF PRESENT ILLNESS  Jayant Castaneda is a 61 y.o. female. New pt here to est care    DM- Previously on metformin and Zigduo which pt states neither agreed with her as she was experiencing GI upset that became intolerable. Doesn't check BS at home. Denies any neuropathy. Previously had eye exams prior to the pandemic. HTN- Taking Iberstartan 300mg. Doesn't check BP at home. Hx of stroke-TIA in 2011 and previous stroke on Xarelto in 2016. Currently on Eliquis 5mg BID. Afib- Ablation in 2017. Hasnt seen a cardio in years     Migraines- Taking topamax 100mg. Previously est care with neuro. Denies any recent headaches or significant vision changes from baseline              Review of Systems   HENT:  Negative for hearing loss. Eyes:  Negative for blurred vision. Respiratory:  Negative for shortness of breath. Cardiovascular:  Negative for chest pain and palpitations. Gastrointestinal:  Negative for abdominal pain, blood in stool, nausea and vomiting. Genitourinary:  Negative for hematuria. Neurological:  Negative for dizziness and headaches. BP (!) 154/95 (BP 1 Location: Right upper arm, BP Patient Position: Sitting, BP Cuff Size: Large adult)   Pulse 85   Temp 97.2 °F (36.2 °C) (Temporal)   Resp 22   Ht 5' 4.08\" (1.628 m)   Wt 254 lb 12.8 oz (115.6 kg)   SpO2 94%   BMI 43.63 kg/m²     Physical Exam  Vitals reviewed. Constitutional:       Appearance: Normal appearance. HENT:      Right Ear: Tympanic membrane normal.      Left Ear: Tympanic membrane normal.      Mouth/Throat:      Comments: MASK  Eyes:      Conjunctiva/sclera: Conjunctivae normal.      Pupils: Pupils are equal, round, and reactive to light. Cardiovascular:      Rate and Rhythm: Normal rate and regular rhythm. Pulses: Normal pulses. Heart sounds: Normal heart sounds. Pulmonary:      Effort: Pulmonary effort is normal.      Breath sounds: Normal breath sounds.    Abdominal:      General: Abdomen is flat. Musculoskeletal:      Right lower leg: No edema. Left lower leg: No edema. Psychiatric:         Mood and Affect: Mood normal.       ASSESSMENT and PLAN    ICD-10-CM ICD-9-CM    1. Primary hypertension  I10 401.9 irbesartan (AVAPRO) 300 mg tablet      2. Type 2 diabetes mellitus with hyperglycemia, without long-term current use of insulin (HCC)  E11.65 250.00 HM DIABETES EYE EXAM     790.29 HM COLONOSCOPY      dulaglutide (TRULICITY) 6.92 FM/3.3 mL sub-q pen      SITagliptin (JANUVIA) 25 mg tablet      Blood-Glucose Meter monitoring kit      lancets misc      glucose blood VI test strips (blood glucose test) strip      3. Hemoglobin A1c greater than 9.0%  R73.09 790.29       4. Intractable migraine without status migrainosus, unspecified migraine type  G43.919 346.91 topiramate (TOPAMAX) 100 mg tablet      REFERRAL TO NEUROLOGY      5. Class 3 severe obesity with body mass index (BMI) of 40.0 to 44.9 in adult, unspecified obesity type, unspecified whether serious comorbidity present ([facility] [address]  E66.01 278.01     Z68.41 V85.41       6. Vaginal itching  N89.8 698.1 fluconazole (DIFLUCAN) 150 mg tablet      7. History of atrial fibrillation  Z86.79 V12.59 apixaban (ELIQUIS) 5 mg tablet      REFERRAL TO CARDIOLOGY      8. History of lupus anticoagulant disorder  Z86.2 V12.3 apixaban (ELIQUIS) 5 mg tablet      9. Obesity screen  Z13.89 V77.8       10. Depression screening negative  Z13.31 V79.0       11. Establishing care with new doctor, encounter for  Z76.89 V65.8       BP not controlled at today visit. Continue Ibersartan 300mg. May need to add additional agent. Stressed importance of checking BP and bringing cuff to next visit. A1C 11.2. Rx Trulicity  & Januvia to help reduce A1C as pt unable to tolerate Metformin. Ordered glucose monitor to check BS at home. Discussed diabetes education including diet and exercise for 10 min  Continue Xarelto for Afib.  Referral to cardio for further evaluation  Referral to neuro for chronic migraines. Continue Topamax. Rx Diflucan 150mg x2 for vaginal itching. If no improvement will swab and send out  Discussed BMI/weight, lifestyle, diet and exercise. Discussed for at least 10 min. Discussed effect on blood pressure, blood sugar, and joints especially  Focus on limiting white carbs, portion control, and moving more. Spent at least 15 min reviewing prior notes, labs & imaging from prior providers and an additional 30 min during todays vist  Follow-up and Dispositions    Return in about 1 month (around 8/28/2022) for DM, HTN.

## 2022-07-28 NOTE — PROGRESS NOTES
Shanta Vásquez is a 61 y.o. female (: 1965) presenting to address:    Chief Complaint   Patient presents with    New Patient    Establish Care    Hypertension    Cholesterol Problem       Vitals:    22 1054   BP: (!) 154/95   Pulse: 85   Resp: 22   Temp: 97.2 °F (36.2 °C)   TempSrc: Temporal   SpO2: 94%   Weight: 254 lb 12.8 oz (115.6 kg)   Height: 5' 4.08\" (1.628 m)   PainSc:   0 - No pain       Hearing/Vision:   No results found. Learning Assessment:   No flowsheet data found. Depression Screening:     3 most recent PHQ Screens 2022   Little interest or pleasure in doing things Not at all   Feeling down, depressed, irritable, or hopeless Not at all   Total Score PHQ 2 0     Fall Risk Assessment:   No flowsheet data found. Abuse Screening:   No flowsheet data found. Coordination of Care Questionaire:     Advanced Directive:   1. Do you have an Advanced Directive? NO    2. Would you like information on Advanced Directives? NO    1. \"Have you been to the ER, urgent care clinic since your last visit? Hospitalized since your last visit? \" No    2. \"Have you seen or consulted any other health care providers outside of the [address] [place] [address] since your last visit? \" No     3. For patients aged 39-70: Has the patient had a colonoscopy? No     If the patient is female:    4. For patients aged 41-77: Has the patient had a mammogram within the past 2 years? Yes - no Care Gap present    5. For patients aged 21-65: Has the patient had a pap smear?  No

## 2022-07-29 ENCOUNTER — TELEPHONE (OUTPATIENT)
Dept: INTERNAL MEDICINE CLINIC | Age: 57
End: 2022-07-29

## 2022-07-29 DIAGNOSIS — N89.8 VAGINAL ITCHING: ICD-10-CM

## 2022-07-29 RX ORDER — FLUCONAZOLE 150 MG/1
150 TABLET ORAL DAILY
Qty: 1 TABLET | Refills: 1 | Status: SHIPPED | OUTPATIENT
Start: 2022-07-29 | End: 2022-07-31

## 2022-07-29 NOTE — TELEPHONE ENCOUNTER
Patient is calling in stating the Diflucan was sent to the wrong pharmacy. Asking that it please be resent to the [address] on file.

## 2022-08-05 ENCOUNTER — PATIENT MESSAGE (OUTPATIENT)
Dept: NEUROLOGY | Age: 57
End: 2022-08-05

## 2022-08-26 ENCOUNTER — OFFICE VISIT (OUTPATIENT)
Dept: NEUROLOGY | Age: 57
End: 2022-08-26
Payer: COMMERCIAL

## 2022-08-26 VITALS
OXYGEN SATURATION: 97 % | SYSTOLIC BLOOD PRESSURE: 130 MMHG | HEART RATE: 91 BPM | BODY MASS INDEX: 43.87 KG/M2 | HEIGHT: 64 IN | WEIGHT: 257 LBS | DIASTOLIC BLOOD PRESSURE: 80 MMHG | RESPIRATION RATE: 18 BRPM

## 2022-08-26 DIAGNOSIS — G43.909 MIGRAINE SYNDROME: Primary | ICD-10-CM

## 2022-08-26 PROCEDURE — 99204 OFFICE O/P NEW MOD 45 MIN: CPT | Performed by: PSYCHIATRY & NEUROLOGY

## 2022-08-26 RX ORDER — KETOROLAC TROMETHAMINE 10 MG/1
10 TABLET, FILM COATED ORAL
Qty: 20 TABLET | Refills: 3 | Status: SHIPPED | OUTPATIENT
Start: 2022-08-26 | End: 2022-08-26

## 2022-08-26 NOTE — PROGRESS NOTES
CC:  headache    Impression:  Chronic episodic migraine    Plan:  Toradol 20 mg at onset of headache  Benadryl 25 mg at onset of headache  Return to clinic in 6 months    HPI:  This is a 42-year-old woman with a past history of atrial fibrillation, prior stroke with transient right-sided weakness, lupus anticoagulant and chronic headaches. She has had these dating back to when she was young. They had gotten quite frequent at one point. She was started on Topamax by another neurologist.  Her headaches have diminished down to about 1 headache per month. She previously tried Imitrex and Maxalt, but had side effects. On one occasion she got the migraine protocol in the emergency room in April and found that it was quite effective. At that visit she was having a complex migraine with dizziness and right-sided weakness. Her imaging was unremarkable. She was treated in the emergency room and released. She has not had another severe headache like that since then. She averages about 2 minor headaches per week that respond to ibuprofen. She has quite a bit of muscle tension in her neck and shoulders which contributes to some headaches. She does not think that her life is very stressful at this point and she is fairly happy in her circumstances. She has some bruxism and TMJ. She does snore. She does not awaken with headaches. She has not had any sinus problems. She has not had any labile blood pressure problems. Past medical history:DM, Afib, lupus anticoagulant, HLD, HTN    Social history: No tobacco, alcohol or drug use. Medication list was reviewed. Family history: Unremarkable    Review of systems: 10 system review of systems is otherwise unremarkable. Exam:  Blood pressure 130/80, pulse 90, respirations 18  General: They are overweight middle-aged individual in no acute distress. HEENT: Pupils equal and reactive. Conjunctiva clear. Oropharynx clear.   Cranium is normocephalic and atraumatic. Neck: No lymphadenopathy or masses noted. Moderate muscle tension. Cardiovascular: Regular rate and rhythm. Peripheral pulses intact. Lungs: Clear to auscultation  Abdomen: Soft and nontender  Extremities: No cyanosis clubbing or edema  Skin: No rash  Neurologic exam:  Mental status: They were alert and oriented x3. Speech is fluent and clear. They are cooperative and appropriate. They follow commands appropriately. Cranial nerves: 2 through 12 are intact  Motor exam: There is full strength bilaterally without atrophy or fasciculations. Sensory: Intact to light touch bilaterally. Cerebellar: No tremor noted. Finger-to-nose movements are symmetric. Rapid alternating movements are symmetric. Gait: Steady and narrow-based. Reflexes: Symmetric and physiologic. Plantar responses are downgoing. Lab:  reviewed    Imaging:  Unremarkable CT brain and CTA brain and carotids. Summary: This is a 50-year-old woman who has infrequent severe headaches. She responded to the migraine protocol, therefore I would suggest we try this as an outpatient management. She has never tried a CGRP inhibitor. I have discussed some exercises that she can do to reduce the muscle tension in her trapezius and shoulder area. She will follow-up with us in 6 months. Thank you for allowing me to participate in the care of this patient.     Altagracia Bean MD  [phone]

## 2022-09-06 ENCOUNTER — OFFICE VISIT (OUTPATIENT)
Dept: CARDIOLOGY CLINIC | Age: 57
End: 2022-09-06
Payer: COMMERCIAL

## 2022-09-06 VITALS
WEIGHT: 258 LBS | SYSTOLIC BLOOD PRESSURE: 124 MMHG | HEART RATE: 103 BPM | BODY MASS INDEX: 44.29 KG/M2 | TEMPERATURE: 98 F | OXYGEN SATURATION: 99 % | DIASTOLIC BLOOD PRESSURE: 82 MMHG

## 2022-09-06 DIAGNOSIS — Z86.79 HISTORY OF ATRIAL FIBRILLATION: ICD-10-CM

## 2022-09-06 DIAGNOSIS — G43.909 MIGRAINE SYNDROME: ICD-10-CM

## 2022-09-06 DIAGNOSIS — I48.91 ATRIAL FIBRILLATION, UNSPECIFIED TYPE (HCC): Primary | ICD-10-CM

## 2022-09-06 DIAGNOSIS — Z86.2 HISTORY OF LUPUS ANTICOAGULANT DISORDER: ICD-10-CM

## 2022-09-06 PROCEDURE — 99204 OFFICE O/P NEW MOD 45 MIN: CPT | Performed by: INTERNAL MEDICINE

## 2022-09-06 PROCEDURE — 93000 ELECTROCARDIOGRAM COMPLETE: CPT | Performed by: INTERNAL MEDICINE

## 2022-09-06 RX ORDER — KETOROLAC TROMETHAMINE 10 MG/1
TABLET, FILM COATED ORAL
COMMUNITY
Start: 2022-08-26

## 2022-09-06 NOTE — PROGRESS NOTES
Identified pt with two pt identifiers(name and ). Reviewed record in preparation for visit and have obtained necessary documentation. Valdo Hays presents today for   Chief Complaint   Patient presents with    New Patient       Pt c/o  HEADACHES. Valdo Hays preferred language for health care discussion is english/other. Personal Protective Equipment:   Personal Protective Equipment was used including: mask-surgical and hands-gloves. Patient was placed on no precaution(s). Patient was masked. Precautions:   Patient currently on None  Patient currently roomed with door closed. Is someone accompanying this pt? no    Is the patient using any DME equipment during [address]? no    Depression Screening:  3 most recent PHQ Screens 2022   Little interest or pleasure in doing things Not at all   Feeling down, depressed, irritable, or hopeless Not at all   Total Score PHQ 2 0       Learning Assessment:  No flowsheet data found. Abuse Screening:  No flowsheet data found. Fall Risk  No flowsheet data found. Pt currently taking Anticoagulant therapy? yes  Pt currently taking Antiplatelet therapy? no    Coordination of Care:  1. Have you been to the ER, urgent care clinic since your last visit? Hospitalized since your last visit? Yes, 2022 for headache, vomiting, history of stroke    2. Have you seen or consulted any other health care providers outside of the [address] [place] [address] since your last visit? Include any pap smears or colon screening. no      Please see Red banners under Allergies and Med Rec to remove outside inquires. All correct information has been verified with patient and added to chart.      Medication's patient's would liked removed has been marked not taking to be removed per Verbal order and read back per Winthrop Cabot, MD

## 2022-09-06 NOTE — PROGRESS NOTES
[facility]    [facility] is 50-year-old female with a history of atrial fibrillation status post ablation, CVA, diabetes, hypertension, hyperlipidemia, Sjogren's syndrome and other medical problems    Patient denies any prior history of MI or CHF. Patient was diagnosed with atrial fibrillation approximately in 2015. Patient had EP study and A. fib ablation successfully in 2016 in [place]. Patient has relocated in this area. She was asked to come see me. She has infrequent episode of palpitation but no presyncope or syncope. She occasionally has dizziness usually positional.  No chest pain or chest tightness concerning for angina. Minimal dyspnea on moderate exertion remained stable. Occasional lower extremity swelling. Taking her medication without any side effect. Denies any nausea, vomiting, abdominal pain, fever, chills, sputum production. No hematuria or other bleeding complaints    Past Medical History:   Diagnosis Date    Atrial fibrillation [facility])     Claustrophobia 04/27/2022    Patient requesting Full Sedation for MRI    CVA (cerebral vascular accident) ([address]     Diabetes mellitus, type 2 ([address]     Family history of early CAD     Father at 40, Brother at 44    GERD (gastroesophageal reflux disease)     Goiter     H/O lupus anticoagulant disorder     HLD (hyperlipidemia)     HTN (hypertension)     Migraines     Reportedly without Aura    THOMPSON (nonalcoholic steatohepatitis)     Sjogren's syndrome ([facility]        Review of Systems:  Cardiac symptoms as noted above in HPI. All others negative. Denies fatigue, malaise, skin rash, joint pain, blurring vision, photophobia, neck pain, hemoptysis, chronic cough, nausea, vomiting, hematuria, burning micturition, BRBPR, chronic headaches. Current Outpatient Medications   Medication Sig    irbesartan (AVAPRO) 300 mg tablet Take 1 Tablet by mouth in the morning.     apixaban (ELIQUIS) 5 mg tablet Take 1 Tablet by mouth two (2) times a day.    ketorolac (TORADOL) 10 mg tablet     OTHER     topiramate (TOPAMAX) 100 mg tablet Take 1 Tablet by mouth in the morning. dulaglutide (TRULICITY) 0.11 OS/5.3 mL sub-q pen 0.5 mL by SubCUTAneous route every seven (7) days. SITagliptin (JANUVIA) 25 mg tablet Take 1 Tablet by mouth in the morning. Blood-Glucose Meter monitoring kit Use to check blood sugars twice daily    lancets misc Use to check blood sugar twice daily    glucose blood VI test strips (blood glucose test) strip Use to check blood sugar twice daily     No current facility-administered medications for this visit. Past Surgical History:   Procedure Laterality Date    HX AFIB ABLATION  2014    Cardiac Ablation and Loop Recorder Implantation    HX HYSTERECTOMY      HX OTHER SURGICAL      Breast Sx    IR BX LIVER PERCUTANEOUS         Allergies and Sensitivities:  Allergies   Allergen Reactions    Coumadin [Warfarin] Unknown (comments)    Lovenox [Enoxaparin] Rash    Xarelto [Rivaroxaban] Unknown (comments)       Family History:  Family History   Problem Relation Age of Onset    COPD Mother     Coronary Art Dis Father 40        Early CAD    Stroke Sister     Coronary Art Dis Sister     Stroke Brother     Coronary Art Dis Brother 44        Early CAD       Social History:  Social History     Tobacco Use    Smoking status: Never     Passive exposure: Never    Smokeless tobacco: Never   Substance Use Topics    Alcohol use: Never    Drug use: Never     She  reports that she has never smoked. She has never been exposed to tobacco smoke. She has never used smokeless tobacco.  She  reports no history of alcohol use.     Physical Exam:  BP Readings from Last 3 Encounters:   09/06/22 124/82   08/26/22 130/80   07/28/22 (!) 154/95         Pulse Readings from Last 3 Encounters:   09/06/22 (!) 103   08/26/22 91   07/28/22 85          Wt Readings from Last 3 Encounters:   09/06/22 117 kg (258 lb) 22 116.6 kg (257 lb)   22 115.6 kg (254 lb 12.8 oz)       Constitutional: Oriented to person, place, and time. HENT: Head: Normocephalic and atraumatic. Eyes: Conjunctivae and extraocular motions are normal.   Neck: No JVD present. Carotid bruit is not appreciated. Cardiovascular: Regular rhythm. No murmur, gallop or rubs appreciated  Lung: Breath sounds normal. No respiratory distress. No ronchi or rales appreciated  Abdominal: No tenderness. No rebound and no guarding. Musculoskeletal: There is trace lower extremity edema. No cynosis  Lymphadenopathy:  No cervical or supraclavicular adenopathy appriciated. Neurological: No gross motor deficit noted. Skin: No visible skin rash noted. No Ear discharge noted  Psychiatric: Normal mood and affect. LABS:   @  Lab Results   Component Value Date/Time    WBC 8.8 2022 06:45 PM    HGB 17.0 (H) 2022 06:45 PM    HCT 50.8 (H) 2022 06:45 PM    PLATELET 284  06:45 PM    MCV 86.7 2022 06:45 PM     Lab Results   Component Value Date/Time    Sodium 140 2022 09:56 AM    Potassium 4.1 2022 09:56 AM    Chloride 106 2022 09:56 AM    CO2 25 2022 09:56 AM    Glucose 269 (H) 2022 09:56 AM    BUN 18 2022 09:56 AM    Creatinine 0.63 2022 09:56 AM     Lipids Latest Ref Rng & Units 2022   Chol, Total <200 MG/(H)   HDL 40 - 60 MG/DL 59   LDL 0 - 100 MG/. 8(H)   Trig <150 MG/DL 91   Chol/HDL Ratio 0 - 5.0   3.9     Lab Results   Component Value Date/Time    ALT (SGPT) 59 (H) 2022 09:56 AM     Lab Results   Component Value Date/Time    Hemoglobin A1c 11.2 (H) 2022 09:56 AM     No results found for: TSH, TSH2, TSH3, TSHP, TSHEXT    EK22: Sinus tachycardia at 103 bpm.  No ST-T changes of ischemia    STRESS TEST (EST, PHARM, NUC, ECHO etc)    CATHETERIZATION    IMPRESSION & PLAN:  Ms. Janet Peña is 79-year-old female    Atrial fibrillation:  According to patient diagnosed approximately 2015 in [place]  Status post A. fib ablation in [address] in [place], nurses sure [address] No details available  On exam and by EKG patient is in sinus rhythm  In the past used to be on beta-blocker but she is not on that anymore  She is on Eliquis for stroke prophylaxis. Will order echo to rule out LV dysfunction because of A. fib    Hypertension: /82. Currently on Avapro. Echo ordered to rule out hypertensive cardiovascular disease    Hyperlipidemia: Last . Unable to tolerate atorvastatin, rosuvastatin and simvastatin because of myalgia. We will try Livalo 2 mg daily. Diabetes: Goal hemoglobin A1c less than 7 is recommended from cardiovascular standpoint. Last hemoglobin A1c 11.2. Defer to PCP    CVA: Currently on Eliquis for stroke prophylaxis because of underlying paroxysmal atrial fibrillation. No residual side effect at this time    This plan was discussed with patient who is in agreement. Thank you for allowing me to participate in patient care. Please feel free to call me if you have any question or concern. Stew Perez MD  Please note: This document has been produced using voice recognition software. Unrecognized errors in transcription may be present.

## 2022-09-06 NOTE — PATIENT INSTRUCTIONS
Testing   Echo  **call office 3-5 days after testing is completed for results**       Start Livalo 2 mg daily

## 2022-09-06 NOTE — LETTER
9/6/2022    Patient: Claire Baez   YOB: 1965   Date of Visit: 9/6/2022     Tabatha Sharpe DO  [address] [place] [address]    Dear Tabatha Sharpe DO,      Thank you for referring Ms. Jose C Martinez to [facility] for evaluation. My notes for this consultation are attached. If you have questions, please do not hesitate to call me. I look forward to following your patient along with you.       Sincerely,    Billy Borjas MD

## 2022-09-08 ENCOUNTER — OFFICE VISIT (OUTPATIENT)
Dept: INTERNAL MEDICINE CLINIC | Age: 57
End: 2022-09-08
Payer: COMMERCIAL

## 2022-09-08 VITALS
WEIGHT: 256 LBS | RESPIRATION RATE: 18 BRPM | OXYGEN SATURATION: 98 % | HEIGHT: 64 IN | SYSTOLIC BLOOD PRESSURE: 137 MMHG | TEMPERATURE: 97.5 F | BODY MASS INDEX: 43.71 KG/M2 | DIASTOLIC BLOOD PRESSURE: 86 MMHG | HEART RATE: 101 BPM

## 2022-09-08 DIAGNOSIS — Z71.82 EXERCISE COUNSELING: ICD-10-CM

## 2022-09-08 DIAGNOSIS — I10 PRIMARY HYPERTENSION: ICD-10-CM

## 2022-09-08 DIAGNOSIS — E11.65 TYPE 2 DIABETES MELLITUS WITH HYPERGLYCEMIA, WITHOUT LONG-TERM CURRENT USE OF INSULIN (HCC): Primary | ICD-10-CM

## 2022-09-08 DIAGNOSIS — G43.919 INTRACTABLE MIGRAINE WITHOUT STATUS MIGRAINOSUS, UNSPECIFIED MIGRAINE TYPE: ICD-10-CM

## 2022-09-08 DIAGNOSIS — G62.9 NEUROPATHY: ICD-10-CM

## 2022-09-08 DIAGNOSIS — Z71.3 DIETARY COUNSELING: ICD-10-CM

## 2022-09-08 DIAGNOSIS — Z13.89 OBESITY SCREEN: ICD-10-CM

## 2022-09-08 DIAGNOSIS — E55.9 VITAMIN D DEFICIENCY: ICD-10-CM

## 2022-09-08 DIAGNOSIS — Z23 ENCOUNTER FOR IMMUNIZATION: ICD-10-CM

## 2022-09-08 DIAGNOSIS — R00.0 TACHYCARDIA: ICD-10-CM

## 2022-09-08 DIAGNOSIS — E66.01 CLASS 3 SEVERE OBESITY WITH BODY MASS INDEX (BMI) OF 40.0 TO 44.9 IN ADULT, UNSPECIFIED OBESITY TYPE, UNSPECIFIED WHETHER SERIOUS COMORBIDITY PRESENT (HCC): ICD-10-CM

## 2022-09-08 DIAGNOSIS — R73.09 HEMOGLOBIN A1C GREATER THAN 9.0%: ICD-10-CM

## 2022-09-08 LAB — HBA1C MFR BLD HPLC: 9.9 %

## 2022-09-08 PROCEDURE — 3046F HEMOGLOBIN A1C LEVEL >9.0%: CPT | Performed by: STUDENT IN AN ORGANIZED HEALTH CARE EDUCATION/TRAINING PROGRAM

## 2022-09-08 PROCEDURE — 83036 HEMOGLOBIN GLYCOSYLATED A1C: CPT | Performed by: STUDENT IN AN ORGANIZED HEALTH CARE EDUCATION/TRAINING PROGRAM

## 2022-09-08 PROCEDURE — 99214 OFFICE O/P EST MOD 30 MIN: CPT | Performed by: STUDENT IN AN ORGANIZED HEALTH CARE EDUCATION/TRAINING PROGRAM

## 2022-09-08 PROCEDURE — 90472 IMMUNIZATION ADMIN EACH ADD: CPT | Performed by: STUDENT IN AN ORGANIZED HEALTH CARE EDUCATION/TRAINING PROGRAM

## 2022-09-08 PROCEDURE — 90471 IMMUNIZATION ADMIN: CPT | Performed by: STUDENT IN AN ORGANIZED HEALTH CARE EDUCATION/TRAINING PROGRAM

## 2022-09-08 PROCEDURE — 90715 TDAP VACCINE 7 YRS/> IM: CPT | Performed by: STUDENT IN AN ORGANIZED HEALTH CARE EDUCATION/TRAINING PROGRAM

## 2022-09-08 PROCEDURE — 90677 PCV20 VACCINE IM: CPT | Performed by: STUDENT IN AN ORGANIZED HEALTH CARE EDUCATION/TRAINING PROGRAM

## 2022-09-08 PROCEDURE — 99401 PREV MED CNSL INDIV APPRX 15: CPT | Performed by: STUDENT IN AN ORGANIZED HEALTH CARE EDUCATION/TRAINING PROGRAM

## 2022-09-08 RX ORDER — PRAVASTATIN SODIUM 10 MG/1
10 TABLET ORAL
Qty: 90 TABLET | Refills: 1 | Status: SHIPPED | OUTPATIENT
Start: 2022-09-08

## 2022-09-08 RX ORDER — IRBESARTAN 300 MG/1
300 TABLET ORAL DAILY
Qty: 30 TABLET | Refills: 0 | Status: SHIPPED | OUTPATIENT
Start: 2022-09-08 | End: 2022-10-06

## 2022-09-08 RX ORDER — TOPIRAMATE 100 MG/1
100 TABLET, FILM COATED ORAL DAILY
Qty: 30 TABLET | Refills: 0 | Status: SHIPPED | OUTPATIENT
Start: 2022-09-08 | End: 2022-10-06

## 2022-09-08 RX ORDER — PRAVASTATIN SODIUM 10 MG/1
10 TABLET ORAL
Qty: 90 TABLET | Refills: 1 | Status: CANCELLED | OUTPATIENT
Start: 2022-09-08

## 2022-09-08 NOTE — PROGRESS NOTES
Tdap and pneumococcal 20 Immunization/s administered 9/8/2022 by Cadence Ferraro with consent. Patient tolerated procedure well. No reactions noted.

## 2022-09-08 NOTE — PROGRESS NOTES
HISTORY OF PRESENT ILLNESS  Steve Everett is a 61 y.o. female. HTN-BP well controlled. Saw cardiology 9/6. Set up for ECHO due to increased heart rate. BP Readings from Last 3 Encounters:  09/08/22 : 137/86  09/06/22 : 124/82  08/26/22 : 130/80    HLD- Was Rx Livalo by cardio as she is unable to tolerate atorvastatin & rousvastatin but was unable to afford it  Lab Results       Component                Value               Date/Time                  LDL, calculated          153.8 (H)           07/26/2022 09:56 AM     DM- Started Trulicity at last visit. Tolerating well. Has noticed that she does experience chills shortly after the injection but doesnt last long. Checking BS and have been less than 200. Has made some changes to her diet. Does admit to some numbness in her pinky toe on the L however she also has RLS and not sure if this is contributing. Lab Results       Component                Value               Date/Time                  Hemoglobin A1c           11.2 (H)            07/26/2022 09:56 AM     Headaches- Saw Neuro 8/26 was Rx Toradol 20 mg and Benadryl 25 mg. Hasnt had to take it since the visit as her migraines have been controlled with the Topamax. Review of Systems   Eyes:  Negative for blurred vision. Respiratory:  Negative for shortness of breath. Cardiovascular:  Negative for chest pain and palpitations. Gastrointestinal:  Negative for abdominal pain. Neurological:  Negative for dizziness and headaches. /86 (BP 1 Location: Right upper arm, BP Patient Position: Sitting, BP Cuff Size: Large adult)   Pulse (!) 101   Temp 97.5 °F (36.4 °C) (Temporal)   Resp 18   Ht 5' 4\" (1.626 m)   Wt 256 lb (116.1 kg)   SpO2 98%   BMI 43.94 kg/m²     Physical Exam  Vitals reviewed. Constitutional:       Appearance: Normal appearance.    HENT:      Right Ear: Tympanic membrane normal.      Left Ear: Tympanic membrane normal.      Mouth/Throat:      Comments: MASK  Eyes:      Conjunctiva/sclera: Conjunctivae normal.      Pupils: Pupils are equal, round, and reactive to light. Cardiovascular:      Rate and Rhythm: Normal rate and regular rhythm. Pulses: Normal pulses. Heart sounds: Normal heart sounds. Pulmonary:      Effort: Pulmonary effort is normal.      Breath sounds: Normal breath sounds. Abdominal:      General: Abdomen is flat. Bowel sounds are normal.   Musculoskeletal:      Right lower leg: No edema. Left lower leg: No edema. Feet:      Right foot:      Protective Sensation: 8 sites tested. 8 sites sensed. Skin integrity: Skin integrity normal.      Left foot:      Protective Sensation: 8 sites tested. 8 sites sensed. Skin integrity: Skin integrity normal.   Skin:     General: Skin is warm. Psychiatric:         Mood and Affect: Mood normal.       ASSESSMENT and PLAN    ICD-10-CM ICD-9-CM    1. Type 2 diabetes mellitus with hyperglycemia, without long-term current use of insulin (HCC)  E11.65 250.00 AMB POC HEMOGLOBIN A1C     790.29 MICROALBUMIN, UR, RAND W/ MICROALB/CREAT RATIO      REFERRAL TO PODIATRY      REFERRAL TO OPHTHALMOLOGY      HEP B SURFACE AB      2. Primary hypertension  I10 401.9 irbesartan (AVAPRO) 300 mg tablet      3. Hemoglobin A1c greater than 9.0%  R73.09 790.29       4. Intractable migraine without status migrainosus, unspecified migraine type  G43.919 346.91 topiramate (TOPAMAX) 100 mg tablet      5. Class 3 severe obesity with body mass index (BMI) of 40.0 to 44.9 in adult, unspecified obesity type, unspecified whether serious comorbidity present ([facility]  E66.01 278.01     Z68.41 V85.41       6. Neuropathy  G62.9 355.9 REFERRAL TO PODIATRY      VITAMIN B12      VITAMIN D, 25 HYDROXY      FOLATE      7. Tachycardia  R00.0 785.0       8.  Encounter for immunization  Z23 V03.89 TDAP, BOOSTRIX, (AGE 10 YRS+), IM      NH IMMUNIZ ADMIN,1 SINGLE/COMB VAC/TOXOID      PNEUMOCOCCAL, PCV20, PREVNAR 20, (AGE 18 YRS+), IM, PF      9. Exercise counseling  Z71.82 V65.41       10. Dietary counseling  Z71.3 V65.3       11. Obesity screen  Z13.89 V77.8       POC A1C 9.9 today. Much improved. Continue Trulicity and Januvia. Discussed diabetes education including diet exercise and medication management. Referral to ophthalmology   BP well controlled, continue Ibersartan  Discussed BMI/weight, lifestyle, diet and exercise. Discussed effect on blood pressure, blood sugar, and joints especially. Focus on limiting white carbs, portion control, and moving more. Discussed for at least 10 min. C/w neurology for migraines  Will order B12, Vitamin D for neuropathy. Referral to podiatry for further evaluation. Discussed neuropathic medications will defer for now  Reviewed recent cardiology notes  Reviewed recent neurology notes  Received PNA 20 and TDAP today  Follow-up and Dispositions    Return in about 3 months (around 12/8/2022) for HTN, DM.

## 2022-09-08 NOTE — TELEPHONE ENCOUNTER
Pharmacy is requesting a refill. Last appt 09/08/22 Next appt 12/08/22    Requested Prescriptions     Pending Prescriptions Disp Refills    pravastatin (PRAVACHOL) 10 mg tablet 90 Tablet 1     Sig: Take 1 Tablet by mouth nightly.

## 2022-09-08 NOTE — PROGRESS NOTES
Claire Baez is a 61 y.o. female (: 1965) presenting to address:    Chief Complaint   Patient presents with    Diabetes    Hypertension       Vitals:    22 1551   BP: 137/86   Pulse: (!) 101   Resp: 18   Temp: 97.5 °F (36.4 °C)   TempSrc: Temporal   SpO2: 98%   Weight: 256 lb (116.1 kg)   Height: 5' 4\" (1.626 m)   PainSc:   0 - No pain       Hearing/Vision:   No results found. Learning Assessment:   No flowsheet data found. Depression Screening:     3 most recent PHQ Screens 2022   Little interest or pleasure in doing things Not at all   Feeling down, depressed, irritable, or hopeless Not at all   Total Score PHQ 2 0     Fall Risk Assessment:   No flowsheet data found. Abuse Screening:   No flowsheet data found. Coordination of Care Questionaire:     Advanced Directive:   1. Do you have an Advanced Directive? NO    2. Would you like information on Advanced Directives? NO    1. \"Have you been to the ER, urgent care clinic since your last visit? Hospitalized since your last visit? \" No    2. \"Have you seen or consulted any other health care providers outside of the [address] [place] [address] since your last visit? \" No     3. For patients aged 39-70: Has the patient had a colonoscopy? No     If the patient is female:    4. For patients aged 41-77: Has the patient had a mammogram within the past 2 years? No    5. For patients aged 21-65: Has the patient had a pap smear?  No

## 2022-09-09 ENCOUNTER — HOSPITAL ENCOUNTER (OUTPATIENT)
Dept: LAB | Age: 57
Discharge: HOME | End: 2022-09-09
Payer: COMMERCIAL

## 2022-09-09 DIAGNOSIS — E11.65 TYPE 2 DIABETES MELLITUS WITH HYPERGLYCEMIA, WITHOUT LONG-TERM CURRENT USE OF INSULIN (HCC): ICD-10-CM

## 2022-09-09 DIAGNOSIS — G62.9 NEUROPATHY: ICD-10-CM

## 2022-09-09 LAB
25(OH)D3 SERPL-MCNC: 15.9 NG/ML (ref 30–100)
CREAT UR-MCNC: 163 MG/DL (ref 30–125)
FOLATE SERPL-MCNC: 11.5 NG/ML (ref 3.1–17.5)
MICROALBUMIN UR-MCNC: 1.98 MG/DL (ref 0–3)
MICROALBUMIN/CREAT UR-RTO: 12 MG/G (ref 0–30)
VIT B12 SERPL-MCNC: 666 PG/ML (ref 211–911)

## 2022-09-09 PROCEDURE — 82607 VITAMIN B-12: CPT

## 2022-09-09 PROCEDURE — 82043 UR ALBUMIN QUANTITATIVE: CPT

## 2022-09-09 PROCEDURE — 36415 COLL VENOUS BLD VENIPUNCTURE: CPT

## 2022-09-09 PROCEDURE — 86706 HEP B SURFACE ANTIBODY: CPT

## 2022-09-09 PROCEDURE — 82746 ASSAY OF FOLIC ACID SERUM: CPT

## 2022-09-09 PROCEDURE — 82306 VITAMIN D 25 HYDROXY: CPT

## 2022-09-12 LAB
HBV SURFACE AB SER QL IA: NEGATIVE
HBV SURFACE AB SERPL IA-ACNC: 4.42 MIU/ML
HEP BS AB COMMENT,HBSAC: ABNORMAL

## 2022-09-12 RX ORDER — ERGOCALCIFEROL 1.25 MG/1
50000 CAPSULE ORAL
Qty: 9 CAPSULE | Refills: 1 | Status: SHIPPED | OUTPATIENT
Start: 2022-09-12

## 2022-09-12 NOTE — PROGRESS NOTES
Your vitamin D level is low. I will send a prescription over for a supplement. You will take 88015D one time a week for 8 weeks. At that time we will recheck to make sure your levels are coming up. In addition it appears you do not have protective immunity to Hep B so we will work on getting you the shot at your next visit.

## 2022-10-06 DIAGNOSIS — E11.65 TYPE 2 DIABETES MELLITUS WITH HYPERGLYCEMIA, WITHOUT LONG-TERM CURRENT USE OF INSULIN (HCC): ICD-10-CM

## 2022-10-06 RX ORDER — SITAGLIPTIN 25 MG/1
TABLET, FILM COATED ORAL
Qty: 90 TABLET | Refills: 0 | Status: SHIPPED | OUTPATIENT
Start: 2022-10-06

## 2022-10-10 ENCOUNTER — TELEPHONE (OUTPATIENT)
Dept: CARDIOLOGY CLINIC | Age: 57
End: 2022-10-10

## 2022-10-18 DIAGNOSIS — E11.65 TYPE 2 DIABETES MELLITUS WITH HYPERGLYCEMIA, WITHOUT LONG-TERM CURRENT USE OF INSULIN (HCC): ICD-10-CM

## 2022-10-19 RX ORDER — DULAGLUTIDE 0.75 MG/.5ML
INJECTION, SOLUTION SUBCUTANEOUS
Qty: 4 ML | Refills: 0 | Status: SHIPPED | OUTPATIENT
Start: 2022-10-19

## 2022-12-07 ENCOUNTER — TELEPHONE (OUTPATIENT)
Dept: INTERNAL MEDICINE CLINIC | Age: 57
End: 2022-12-07

## 2022-12-07 DIAGNOSIS — E11.65 TYPE 2 DIABETES MELLITUS WITH HYPERGLYCEMIA, WITHOUT LONG-TERM CURRENT USE OF INSULIN (HCC): Primary | ICD-10-CM

## 2022-12-07 DIAGNOSIS — I10 PRIMARY HYPERTENSION: ICD-10-CM

## 2022-12-07 DIAGNOSIS — E78.2 MIXED HYPERLIPIDEMIA: ICD-10-CM

## 2022-12-07 DIAGNOSIS — E55.9 VITAMIN D DEFICIENCY: ICD-10-CM

## 2022-12-07 NOTE — TELEPHONE ENCOUNTER
Pt called wanting to do labs I did not see them in the chart I did tell the pt I will call back around 2p to let her know if they are in her chart.

## 2022-12-08 ENCOUNTER — VIRTUAL VISIT (OUTPATIENT)
Dept: INTERNAL MEDICINE CLINIC | Age: 57
End: 2022-12-08
Payer: COMMERCIAL

## 2022-12-08 DIAGNOSIS — Z78.9 STATIN INTOLERANCE: ICD-10-CM

## 2022-12-08 DIAGNOSIS — E66.01 CLASS 3 SEVERE OBESITY WITH BODY MASS INDEX (BMI) OF 40.0 TO 44.9 IN ADULT, UNSPECIFIED OBESITY TYPE, UNSPECIFIED WHETHER SERIOUS COMORBIDITY PRESENT (HCC): ICD-10-CM

## 2022-12-08 DIAGNOSIS — J01.90 SUBACUTE SINUSITIS, UNSPECIFIED LOCATION: ICD-10-CM

## 2022-12-08 DIAGNOSIS — E78.2 MIXED HYPERLIPIDEMIA: ICD-10-CM

## 2022-12-08 DIAGNOSIS — Z13.89 OBESITY SCREEN: ICD-10-CM

## 2022-12-08 DIAGNOSIS — E11.65 TYPE 2 DIABETES MELLITUS WITH HYPERGLYCEMIA, WITHOUT LONG-TERM CURRENT USE OF INSULIN (HCC): ICD-10-CM

## 2022-12-08 DIAGNOSIS — Z71.3 DIETARY COUNSELING: ICD-10-CM

## 2022-12-08 DIAGNOSIS — Z71.82 EXERCISE COUNSELING: ICD-10-CM

## 2022-12-08 DIAGNOSIS — I10 PRIMARY HYPERTENSION: Primary | ICD-10-CM

## 2022-12-08 PROCEDURE — 99214 OFFICE O/P EST MOD 30 MIN: CPT | Performed by: STUDENT IN AN ORGANIZED HEALTH CARE EDUCATION/TRAINING PROGRAM

## 2022-12-08 PROCEDURE — 3046F HEMOGLOBIN A1C LEVEL >9.0%: CPT | Performed by: STUDENT IN AN ORGANIZED HEALTH CARE EDUCATION/TRAINING PROGRAM

## 2022-12-08 PROCEDURE — 99401 PREV MED CNSL INDIV APPRX 15: CPT | Performed by: STUDENT IN AN ORGANIZED HEALTH CARE EDUCATION/TRAINING PROGRAM

## 2022-12-08 RX ORDER — AZITHROMYCIN 250 MG/1
TABLET, FILM COATED ORAL
Qty: 6 TABLET | Refills: 0 | Status: SHIPPED | OUTPATIENT
Start: 2022-12-08 | End: 2022-12-13

## 2022-12-08 RX ORDER — DULAGLUTIDE 1.5 MG/.5ML
1.5 INJECTION, SOLUTION SUBCUTANEOUS
Qty: 4 EACH | Refills: 3 | Status: SHIPPED | OUTPATIENT
Start: 2022-12-08

## 2022-12-08 NOTE — PROGRESS NOTES
For virtual visit patient would like to receive link via TEXT/EMAIL: text     Nicho Weeks is a 61 y.o. female (: 1965) evaluated via telephone on 2022 to address:    No chief complaint on file. There were no vitals filed for this visit. Allergies Reviewed. Learning Assessment:   No flowsheet data found. Depression Screening:     3 most recent PHQ Screens 2022   Little interest or pleasure in doing things Not at all   Feeling down, depressed, irritable, or hopeless Not at all   Total Score PHQ 2 0     Fall Risk Assessment:   No flowsheet data found. Abuse Screening:   No flowsheet data found. Coordination of Care Questionaire:   1. Have you been to the ER, urgent care clinic since your last visit? Hospitalized since your last visit? NO    2. Have you seen or consulted any other health care providers outside of the [address] [place] [address] since your last visit? Include any pap smears or colon screening. YES Dr. Antonio Valadez    Advanced Directive:   1. Do you have an Advanced Directive? NO    2. Would you like information on Advanced Directives?  NO

## 2022-12-08 NOTE — PROGRESS NOTES
Luisa Wright (: 1965) is a 61 y.o. female, established patient, here for evaluation of the following chief complaint(s):   Hypertension and Diabetes       ASSESSMENT/PLAN:  Below is the assessment and plan developed based on review of pertinent history, labs, studies, and medications. 1. Primary hypertension  2. Type 2 diabetes mellitus with hyperglycemia, without long-term current use of insulin (HCC)  -     dulaglutide (Trulicity) 1.5 EQ/6.9 mL sub-q pen; 0.5 mL by SubCUTAneous route every seven (7) days. , Normal, Disp-4 Each, R-3  -     REFERRAL TO PHARMACIST  3. Class 3 severe obesity with body mass index (BMI) of 40.0 to 44.9 in adult, unspecified obesity type, unspecified whether serious comorbidity present ([facility]  4. Mixed hyperlipidemia  -     REFERRAL TO PHARMACIST  5. Statin intolerance  -     REFERRAL TO PHARMACIST  6. Subacute sinusitis, unspecified location  -     azithromycin (ZITHROMAX) 250 mg tablet; Take 2 tablets today, then take 1 tablet daily, Normal, Disp-6 Tablet, R-0  7. Obesity screen  8. Exercise counseling  9. Dietary counseling  BP well controlled on Ibersartan, will continue. Discussed importance of limiting sodium in diet and getting 150min of exercise a day. Increasing Trulicity to 0.6TU today. Continue to use every 7 days. Will check A1C. Discussed importance of adhering to diabetic diet and checking BS several times daily  Pt unable to tolerate a statin. Will refer to pharm D to get recommendations for alternatives. Will continue to monitor lipids  Discussed BMI/weight, lifestyle, diet and exercise. Discussed effect on blood pressure, blood sugar, and joints especially. Focus on limiting white carbs, portion control, and moving more. Discussed for at least 10 min. Sinus infection doesn't seem to be improving.  Rx Azthromycin isndi for 5 days      Return in about 3 months (around 3/8/2023) for HTN, DM.    SUBJECTIVE/OBJECTIVE:  Here to fu on chronic conditions    T2D- BS have been running higher. Reports fasting BS in 200s. She hasnt been adhering to her diet. Has been eating more sweets. She is still using Trulicity 6.88 weekly. Experiencing increased hunger immediately after injection but is only able to eat a small portion. Denies hypoglycemic episodes    HLD- Stating experiencing joint pain in her knees and elbows two months into taking pravastatin. She stopped 1 week ago and symptoms have improved. She experienced similar symptoms with Crestor and Lipitor. Un able to afford Livalo   Lab Results       Component                Value               Date/Time                  LDL, calculated          153.8 (H)           07/26/2022 09:56 AM     HTN-Controlled on Iberstartan. Has upcoming appointment with cardio in March for follow up. ECHO in Oct 2022 showed EF of 55 - 60%. Left ventricle size is normal. Mild septal thickening. BP Readings from Last 3 Encounters:  09/08/22 : 137/86  09/06/22 : 124/82  08/26/22 : 130/80     Presents today with URI. Started with sore throat on 11/29 and went to  and was given a mouth wash to help, The sore throat subsided and the congestion lingered. Still having cough with colorful mucus and sinus pressure. Review of Systems   HENT:  Positive for congestion and sinus pain. Respiratory:  Negative for shortness of breath. Cardiovascular:  Negative for chest pain and palpitations. Gastrointestinal:  Negative for abdominal pain. Neurological:  Negative for dizziness, light-headedness and headaches. Psychiatric/Behavioral:  Negative for confusion.        No data recorded     Physical Exam    [INSTRUCTIONS:  \"[x]\" Indicates a positive item  \"[]\" Indicates a negative item  -- DELETE ALL ITEMS NOT EXAMINED]    Constitutional: [x] Appears well-developed and well-nourished [x] No apparent distress      [] Abnormal -     Mental status: [x] Alert and awake  [x] Oriented to person/place/time [x] Able to follow commands    [] Abnormal -     Eyes:   EOM    [x]  Normal    [] Abnormal -   Sclera  [x]  Normal    [] Abnormal -          Discharge [x]  None visible   [] Abnormal -     HENT: [x] Normocephalic, atraumatic  [] Abnormal -   [x] Mouth/Throat: Mucous membranes are moist    External Ears [x] Normal  [] Abnormal -    Neck: [x] No visualized mass [] Abnormal -     Pulmonary/Chest: [x] Respiratory effort normal   [x] No visualized signs of difficulty breathing or respiratory distress        [] Abnormal -      Musculoskeletal:   [x] Normal gait with no signs of ataxia         [x] Normal range of motion of neck        [] Abnormal -     Neurological:        [x] No Facial Asymmetry (Cranial nerve 7 motor function) (limited exam due to video visit)          [x] No gaze palsy        [] Abnormal -          Skin:        [x] No significant exanthematous lesions or discoloration noted on facial skin         [] Abnormal -            Psychiatric:       [x] Normal Affect [] Abnormal -        [x] No Hallucinations    Other pertinent observable physical exam findings:-    On this date 12/08/2022 I have spent 30 minutes reviewing previous notes, test results and face to face (virtual) with the patient discussing the diagnosis and importance of compliance with the treatment plan as well as documenting on the day of the visit. Abrahan Hyatt, was evaluated through a synchronous (real-time) audio-video encounter. The patient (or guardian if applicable) is aware that this is a billable service, which includes applicable co-pays. This Virtual Visit was conducted with patient's (and/or legal guardian's) consent. The visit was conducted pursuant to the emergency declaration under the [address] [place] [address] authority and the [employer] Act. Patient identification was verified, and a caregiver was present when appropriate.   The patient was located at: Home: [address] [place] [phone]  The provider was located at: Other: Home office       An electronic signature was used to authenticate this note.   -- Pedro Luis Robison, DO

## 2022-12-17 DIAGNOSIS — E11.65 TYPE 2 DIABETES MELLITUS WITH HYPERGLYCEMIA, WITHOUT LONG-TERM CURRENT USE OF INSULIN (HCC): ICD-10-CM

## 2022-12-18 RX ORDER — SITAGLIPTIN 25 MG/1
TABLET, FILM COATED ORAL
Qty: 90 TABLET | Refills: 0 | Status: SHIPPED | OUTPATIENT
Start: 2022-12-18

## 2022-12-25 DIAGNOSIS — I10 PRIMARY HYPERTENSION: ICD-10-CM

## 2022-12-25 DIAGNOSIS — G43.919 INTRACTABLE MIGRAINE WITHOUT STATUS MIGRAINOSUS, UNSPECIFIED MIGRAINE TYPE: ICD-10-CM

## 2022-12-26 RX ORDER — TOPIRAMATE 100 MG/1
TABLET, FILM COATED ORAL
Qty: 30 TABLET | Refills: 0 | Status: SHIPPED | OUTPATIENT
Start: 2022-12-26

## 2022-12-26 RX ORDER — IRBESARTAN 300 MG/1
TABLET ORAL
Qty: 90 TABLET | Refills: 0 | Status: SHIPPED | OUTPATIENT
Start: 2022-12-26 | End: 2022-12-27 | Stop reason: SDUPTHER

## 2022-12-27 DIAGNOSIS — I10 PRIMARY HYPERTENSION: ICD-10-CM

## 2022-12-27 RX ORDER — IRBESARTAN 300 MG/1
300 TABLET ORAL DAILY
Qty: 90 TABLET | Refills: 0 | Status: SHIPPED | OUTPATIENT
Start: 2022-12-27

## 2022-12-27 NOTE — TELEPHONE ENCOUNTER
Pharmacy faxed over refill request. Last ov  12/8/22, no future appt's made. Requested Prescriptions     Pending Prescriptions Disp Refills    irbesartan (AVAPRO) 300 mg tablet 90 Tablet 0     Sig: Take 1 Tablet by mouth daily.

## 2023-01-26 DIAGNOSIS — G43.919 INTRACTABLE MIGRAINE WITHOUT STATUS MIGRAINOSUS, UNSPECIFIED MIGRAINE TYPE: ICD-10-CM

## 2023-01-26 DIAGNOSIS — E55.9 VITAMIN D DEFICIENCY: ICD-10-CM

## 2023-01-26 RX ORDER — ERGOCALCIFEROL 1.25 MG/1
CAPSULE ORAL
Qty: 9 CAPSULE | Refills: 0 | Status: SHIPPED | OUTPATIENT
Start: 2023-01-26

## 2023-01-26 RX ORDER — TOPIRAMATE 100 MG/1
TABLET, FILM COATED ORAL
Qty: 30 TABLET | Refills: 2 | Status: SHIPPED | OUTPATIENT
Start: 2023-01-26

## 2023-03-01 RX ORDER — SITAGLIPTIN 25 MG/1
TABLET, FILM COATED ORAL
Qty: 90 TABLET | Refills: 0 | Status: SHIPPED | OUTPATIENT
Start: 2023-03-01

## 2023-03-07 ENCOUNTER — PHARMACY VISIT (OUTPATIENT)
Facility: CLINIC | Age: 58
End: 2023-03-07

## 2023-03-07 DIAGNOSIS — E11.65 TYPE 2 DIABETES MELLITUS WITH HYPERGLYCEMIA, WITHOUT LONG-TERM CURRENT USE OF INSULIN (HCC): Primary | ICD-10-CM

## 2023-03-07 RX ORDER — GLUCOSAMINE HCL/CHONDROITIN SU 500-400 MG
CAPSULE ORAL
COMMUNITY
Start: 2022-07-28

## 2023-03-07 NOTE — PROGRESS NOTES
Pharmacy Progress Note - Diabetes Management    S/O: Ms. Oh Khan 60 y.o. female, referred by Maty Johnson DO, was seen today for diabetes management visit. Patient's last A1c was 9.9% in September 2022. Brief History: Patient has had diabetes for several years. She was started on Trulicity and Januvia by her PCP in July of last year. This helped bring her A1c down to 9.9% in September, and Trulicity was eventually increased to 1.5 mg weekly a few months ago. Pt states she previously took metformin but did not tolerate this well. Pt contributes part of her uncontrolled diabetes to her diet. She states she doesn't always eat like she should and is often unsure of how many carbohydrate is too much. Patient also referred to me for recommendations regarding treatment of hyperlipidemia due to pt being unable to tolerate statins. Pt has tried rosuvastatin, pravastatin, and atorvastatin in the past, all of which caused muscle pain and weakness. Patient has a history of TIA and stroke, as well as a significant family history of premature ASCVD. Current anti-hyperglycemic regimen includes:    Key Antihyperglycemic Medications               JANUVIA 25 MG tablet TAKE 1 TABLET EVERY MORNING    dulaglutide (TRULICITY) 1.5 OY/4.8CG SC injection Inject 1.5 mg into the skin every 7 days     Self Monitoring Blood Glucose (SMBG) or CGM:  - Patient reports testing once daily.  - Pt reports readings are typically in the 180-200s. She normally checks her BG in the morning, but occasionally checks at other times and finds her readings to be within the same range. Nutrition/Lifestyle Modifications:  Diet:  - Eats 2-3 meals/day ;  - Breakfast: PB toast and banana  - Lunch: Varies. Soup or salad, or may go out to eat and get something like a quesadilla. Sometimes skips lunch. - Dinner: Meat,  vegetables, and small portion of rice or potatoes. States she cut out pasta.   - Beverage(s): water or unsweet tea  - States her biggest issue is dessert: craves something sweet every day. Used to eat a large bowl of ice cream every night, but now has stopped doing this and may have small portion of ice cream or cookies for dessert.       Past Medical History:   Diagnosis Date    Atrial fibrillation ([facility]     S/P Ablatioin in 2016 at [address] 04/27/2022    Patient requesting Full Sedation for MRI    CVA (cerebral vascular accident) ([facility]     Diabetes mellitus, type 2 ([facility]     Family history of early CAD     Father at 40, Brother at 44    GERD (gastroesophageal reflux disease)     Goiter     H/O lupus anticoagulant disorder     HLD (hyperlipidemia)     HTN (hypertension)     Migraines     Reportedly without Aura    BOB (nonalcoholic steatohepatitis)     Sjogren's syndrome (HCC)        Patient Active Problem List   Diagnosis    Vertigo    History of atrial fibrillation    History of lupus anticoagulant disorder    Right sided weakness    Headache    Migraine syndrome       Allergies   Allergen Reactions    Enoxaparin Rash and Itching    Rivaroxaban      Other reaction(s): stroke symotms despite  med    Warfarin Hives       Current Outpatient Medications   Medication Sig    blood glucose monitor strips Use to check blood sugar twice daily    JANUVIA 25 MG tablet TAKE 1 TABLET EVERY MORNING    Lancets MISC Use to check blood sugar twice daily    apixaban (ELIQUIS) 5 MG TABS tablet Take 5 mg by mouth 2 times daily    dulaglutide (TRULICITY) 1.5 SN/4.2QQ SC injection Inject 1.5 mg into the skin every 7 days    ergocalciferol (ERGOCALCIFEROL) 1.25 MG (69320 UT) capsule Take 50,000 Units by mouth every 7 days    irbesartan (AVAPRO) 300 MG tablet Take 300 mg by mouth daily    ketorolac (TORADOL) 10 MG tablet ceived the following from [facility]: Outside name: ketorolac (TORADOL) 10 mg tablet    topiramate (TOPAMAX) 100 MG tablet Take 1 tablet by mouth once daily     No current facility-administered medications for this visit. Labs/Vitals: Wt Readings from Last 3 Encounters:   09/08/22 256 lb (116.1 kg)   09/06/22 258 lb (117 kg)   08/26/22 257 lb (116.6 kg)     BP Readings from Last 3 Encounters:   09/08/22 137/86   09/06/22 124/82   08/26/22 130/80         Hemoglobin A1C, POC   Date Value Ref Range Status   09/08/2022 9.9 % Final       HbA1c:  Hemoglobin A1C   Date Value Ref Range Status   07/26/2022 11.2 (H) 4.2 - 5.6 % Final     Comment:     (NOTE)  HbA1C Interpretive Ranges  <5.7              Normal  5.7 - 6.4         Consider Prediabetes  >6.5              Consider Diabetes       No components found for: 2       A/P:    Diabetes Management:  - Per ADA guidelines, Pt's A1c is not at goal of < 7%. - Current BGM trend shows patients fasting BG is consistently elevated above goal of .  - Patient currently on Trulicity and Januvia. Use of DPP-4 inhibitors (Januvia) with GLP-1 Mina (Trulicity) is not recommended as DPP4-I's do not provide clinically significant additional benefit when used with GLP-1 RA. Recommend discontinuation of Januvia and use of alternative agent in combination with Trulicity, such as Jardiance 10 mg daily.  - Educated pt on the importance of moderating carbohydrate intake. Reviewed sources of carbohydrates and method to help determine appropriate portion sizes (e.g., Diabetes Plate Method). Educated pt about reading nutrition labels w/ a focus on carbohydrate content and serving size. Recommend moderating and diversifying carbohydrate intake to max of ~45 grams/meal and 15 grams/snack ; at least 1 serving of protein/meal.  Reviewed low-carb alternatives to existing food choices. Provided resources on healthy food choices, nutrition label, food groups, and portion sizes to assist pt w/ decision making/meal planning.  - Recommend checking BG 1-2 times daily.  - Patient due for A1c, recommend A1c check at PCP visit next week.     Hyperlipidemia:  - Patient has history of ASCVD (TIA and stroke) as well as significant family history of premature ASCVD. LDL goal < 70 recommended. Patient's most recent LDL was 153. Patient not current on lipid lowering therapy due to being unable to tolerate statins. Patient has had trial of 3 statin medications in the past and had the same muscle side effects with each. Given history of ASCVD, recommend PSCK9 inhibitor for this patient. Dyan Reddy is on formulary with patients insurance. Recommended dosing of Repatha Sureclick is 580 mg (1 pen) subcutaneously every 2 weeks. Medication will likely need prior authorization. Recommend recheck of lipids prior to starting medication to obtain new baseline. Patient verbalized understanding of the information presented and all of the patients questions were answered. Patient advised to call the office with any additional questions or concerns. Notifications of recommendations will be sent to Jennifer Brown DO for review.     Thank you,  Usha Benjamin, PharmD, [facility]    Program: Medical Group  CPA in place:  No  Recommendation Provided To: Provider: 3 via Note to Provider and Patient/Caregiver: 1 via Virtual Visit  Intervention Detail: Discontinued Rx: 1, reason: Duplicate Therapy, Ineffective Therapy, Lab(s) Ordered, and New Rx: 2, reason: Needs Additional Therapy  Intervention Accepted By: Provider: 0 and Patient/Caregiver: 0  Time Spent (min):  75

## 2023-03-12 SDOH — ECONOMIC STABILITY: FOOD INSECURITY: WITHIN THE PAST 12 MONTHS, THE FOOD YOU BOUGHT JUST DIDN'T LAST AND YOU DIDN'T HAVE MONEY TO GET MORE.: PATIENT DECLINED

## 2023-03-12 SDOH — ECONOMIC STABILITY: FOOD INSECURITY: WITHIN THE PAST 12 MONTHS, YOU WORRIED THAT YOUR FOOD WOULD RUN OUT BEFORE YOU GOT MONEY TO BUY MORE.: PATIENT DECLINED

## 2023-03-12 SDOH — ECONOMIC STABILITY: INCOME INSECURITY: HOW HARD IS IT FOR YOU TO PAY FOR THE VERY BASICS LIKE FOOD, HOUSING, MEDICAL CARE, AND HEATING?: PATIENT DECLINED

## 2023-03-12 SDOH — ECONOMIC STABILITY: TRANSPORTATION INSECURITY
IN THE PAST 12 MONTHS, HAS LACK OF TRANSPORTATION KEPT YOU FROM MEETINGS, WORK, OR FROM GETTING THINGS NEEDED FOR DAILY LIVING?: PATIENT DECLINED

## 2023-03-12 SDOH — ECONOMIC STABILITY: HOUSING INSECURITY
IN THE LAST 12 MONTHS, WAS THERE A TIME WHEN YOU DID NOT HAVE A STEADY PLACE TO SLEEP OR SLEPT IN A SHELTER (INCLUDING NOW)?: NO

## 2023-03-14 ENCOUNTER — OFFICE VISIT (OUTPATIENT)
Age: 58
End: 2023-03-14
Payer: COMMERCIAL

## 2023-03-14 VITALS
SYSTOLIC BLOOD PRESSURE: 122 MMHG | TEMPERATURE: 97 F | BODY MASS INDEX: 42.57 KG/M2 | DIASTOLIC BLOOD PRESSURE: 64 MMHG | OXYGEN SATURATION: 100 % | HEART RATE: 95 BPM | WEIGHT: 248 LBS

## 2023-03-14 DIAGNOSIS — I10 ESSENTIAL HYPERTENSION WITH GOAL BLOOD PRESSURE LESS THAN 140/90: Primary | ICD-10-CM

## 2023-03-14 DIAGNOSIS — E78.00 PURE HYPERCHOLESTEROLEMIA: ICD-10-CM

## 2023-03-14 DIAGNOSIS — I48.0 PAF (PAROXYSMAL ATRIAL FIBRILLATION) (HCC): ICD-10-CM

## 2023-03-14 PROCEDURE — 99214 OFFICE O/P EST MOD 30 MIN: CPT | Performed by: INTERNAL MEDICINE

## 2023-03-14 PROCEDURE — 3078F DIAST BP <80 MM HG: CPT | Performed by: INTERNAL MEDICINE

## 2023-03-14 PROCEDURE — 3074F SYST BP LT 130 MM HG: CPT | Performed by: INTERNAL MEDICINE

## 2023-03-14 NOTE — PROGRESS NOTES
[facility]    Ingrid Hammer is 60 y.o. female with a history of atrial fibrillation status post ablation, CVA, diabetes, hypertension, hyperlipidemia, Sjogren's syndrome and other medical problems    Patient denies any prior history of MI or CHF. Patient  was diagnosed with atrial fibrillation approximately in 2015. Patient had EP study and A. fib ablation successfully in 2016 in [place]. Patient is here today for follow-up appointment for atrial fibrillation  Denies any hospital admission or ER visit. Denies any new medication  Able to perform activity without any limitation. Continues to perform activities of daily living. No significant palpitation, presyncope or syncope. No specific cardiac complaint at this time. Denies any nausea, vomiting, abdominal pain, fever, chills, sputum production. No hematuria or other bleeding complaints       Past Medical History:   Diagnosis Date    Atrial fibrillation ([facility]     S/P Ablatioin in 2016 at [address] 04/27/2022    Patient requesting Full Sedation for MRI    CVA (cerebral vascular accident) ([facility]     Diabetes mellitus, type 2 ([address]     Family history of early CAD     Father at 40, Brother at 44    GERD (gastroesophageal reflux disease)     Goiter     H/O lupus anticoagulant disorder     HLD (hyperlipidemia)     HTN (hypertension)     Migraines     Reportedly without Aura    BOB (nonalcoholic steatohepatitis)     Sjogren's syndrome ([facility]        Review of Systems:  Cardiac symptoms as noted above in HPI. All others negative. Denies fatigue, malaise, skin rash, joint pain, blurring vision, photophobia, neck pain, hemoptysis, chronic cough, nausea, vomiting, hematuria, burning micturition, BRBPR, chronic headaches.     Current Outpatient Medications   Medication Sig    blood glucose monitor strips Use to check blood sugar twice daily    JANUVIA 25 MG tablet TAKE 1 TABLET EVERY MORNING    Lancets MISC Use to check blood sugar twice daily    apixaban (ELIQUIS) 5 MG TABS tablet Take 5 mg by mouth 2 times daily    dulaglutide (TRULICITY) 1.5 NX/0.1SJ SC injection Inject 1.5 mg into the skin every 7 days    ergocalciferol (ERGOCALCIFEROL) 1.25 MG (23445 UT) capsule Take 50,000 Units by mouth every 7 days    irbesartan (AVAPRO) 300 MG tablet Take 300 mg by mouth daily    ketorolac (TORADOL) 10 MG tablet ceived the following from [facility]: Outside name: ketorolac (TORADOL) 10 mg tablet    topiramate (TOPAMAX) 100 MG tablet Take 1 tablet by mouth once daily     No current facility-administered medications for this visit. Past Surgical History:   Procedure Laterality Date    ABLATION OF DYSRHYTHMIC FOCUS  2014    Cardiac Ablation and Loop Recorder Implantation    HYSTERECTOMY (CERVIX STATUS UNKNOWN)      IR BIOPSY LIVER PERCUTANEOUS      OTHER SURGICAL HISTORY      Breast Sx       Allergies and Sensitivities:  Allergies   Allergen Reactions    Enoxaparin Rash and Itching    Rivaroxaban      Other reaction(s): stroke symotms despite  med    Warfarin Hives       Family History:  Family History   Problem Relation Age of Onset    Coronary Art Dis Father 40        Early CAD    Stroke Brother     Coronary Art Dis Sister     Stroke Sister     Coronary Art Dis Brother 44        Early CAD    COPD Mother        Social History:  Social History     Tobacco Use    Smoking status: Never    Smokeless tobacco: Never   Substance Use Topics    Alcohol use: Never    Drug use: Never     She  reports that she has never smoked. She has never used smokeless tobacco.  She  reports no history of alcohol use.     Physical Exam:  BP Readings from Last 3 Encounters:   09/08/22 137/86   09/06/22 124/82   08/26/22 130/80         Pulse Readings from Last 3 Encounters:   09/08/22 (!) 101   09/06/22 (!) 103   08/26/22 91          Wt Readings from Last 3 Encounters:   09/08/22 256 lb (116.1 kg)   09/06/22 258 lb (117 kg)   08/26/22 257 lb (116.6 kg)       Constitutional: Oriented to person, place, and time. HENT: Head: Normocephalic and atraumatic. Neck: No JVD present. Cardiovascular: Regular rhythm. No murmur, gallop or rubs appreciated  Lung: Breath sounds normal. No respiratory distress. No ronchi or rales appreciated  Abdominal: No tenderness. No rebound and no guarding. Musculoskeletal: There is no lower extremity edema. No cynosis    LABS:   @  Lab Results   Component Value Date/Time    WBC 8.8 04/27/2022 06:45 PM    HGB 17.0 04/27/2022 06:45 PM    HCT 50.8 04/27/2022 06:45 PM     04/27/2022 06:45 PM    MCV 86.7 04/27/2022 06:45 PM     Lab Results   Component Value Date/Time     07/26/2022 09:56 AM    K 4.1 07/26/2022 09:56 AM     07/26/2022 09:56 AM    CO2 25 07/26/2022 09:56 AM    BUN 18 07/26/2022 09:56 AM    CREATININE 0.63 07/26/2022 09:56 AM    GLUCOSE 269 07/26/2022 09:56 AM    CALCIUM 9.3 07/26/2022 09:56 AM       Lipids Latest Ref Rng & Units 7/26/2022 4/27/2022   Chol <200 MG/(H) -   HDL 40 - 60 MG/DL 59 -   LDL Calc 0 - 100 MG/. 8(H) -   VLDL Calc MG/DL 18.2 -   Trig <150 MG/DL 91 -   Ratio 0 - 5.0   3.9 -   ALT 13 - 56 U/L 59(H) 84(H)   AST 10 - 38 U/L 31 40(H)     Lab Results   Component Value Date/Time    ALT 59 07/26/2022 09:56 AM     Hemoglobin A1C   Date Value Ref Range Status   07/26/2022 11.2 (H) 4.2 - 5.6 % Final     Comment:     (NOTE)  HbA1C Interpretive Ranges  <5.7              Normal  5.7 - 6.4         Consider Prediabetes  >6.5              Consider Diabetes       No results found for: TSH, TSHREFLEX, TSHFT4, TSHELE, ROF4XXT, TSHHS    TRANSTHORACIC ECHOCARDIOGRAM (TTE) COMPLETE (CONTRAST/BUBBLE/3D PRN) 10/12/2022  9:48 AM, 10/12/2022 12:00 AM (Final)      Left Ventricle: Normal left ventricular systolic function with a visually estimated EF of 55 - 60%. Left ventricle size is normal. Mild septal thickening.  Normal wall motion. Normal diastolic function. Right Ventricle: Right ventricle size is normal. Mildly reduced systolic function. TAPSE is abnormal.    Aortic Valve: No regurgitation. No stenosis. Mitral Valve: Mildly thickened leaflet. Trace regurgitation. No stenosis noted. Tricuspid Valve: Trace regurgitation. No stenosis noted. Pulmonary Arteries: Pulmonary hypertension not present. The estimated PASP is 23 mmHg. IMPRESSION & PLAN:  Nury Cancino  is 60 y.o. female with    Atrial fibrillation:  According  to patient diagnosed approximately 2015 in [place]  Status post A. fib ablation in [address] in [place], nurses sure [address] No details available  On exam, patient is in sinus rhythm  In the past used to be on  beta-blocker but she is not on that anymore  She is on Eliquis for stroke prophylaxis. Hypertension:  /70. Currently on Avapro. Echo ordered to rule out hypertensive cardiovascular disease    Hyperlipidemia: Last  in 07/2022. Unable to tolerate atorvastatin, rosuvastatin and simvastatin because of myalgia. Unable to afford Livalo in the past  Because of diabetes and risk factors, I would recommend she start injectable medication for hyperlipidemia, Leqvio. Side effects discussed. Alternatively Rapatha can be considered. Diabetes: Goal hemoglobin A1c less than 7 is recommended from cardiovascular standpoint. Last hemoglobin A1c 11.2. Defer to PCP      CVA: Currently on Eliquis for stroke prophylaxis because of underlying paroxysmal atrial fibrillation. No residual side effect at this  time    Obesity: Max weight 320 lbs. Current weight 248lbs. This plan was discussed with patient who is in agreement. Thank you for allowing me to participate in patient care. Please feel free to call me if you have any question or concern. Gennaro Rodriguez MD  Please note: This document has been produced using voice recognition software.  Unrecognized errors in transcription may be present.

## 2023-03-14 NOTE — PROGRESS NOTES
Identified pt with two pt identifiers(name and ). Reviewed record in preparation for visit and have obtained necessary documentation. Maria Isabel Elder presents today for   Chief Complaint   Patient presents with    Follow-up     6m       Pt denied  DIZZINESS, SOB, CHEST PAIN/ PRESSURE, FATIGUE/WEAKNESS, HEADACHES, SWELLING. Maria Isabel Elder preferred language for health care discussion is english/other. Personal Protective Equipment:   Personal Protective Equipment was used including: mask-surgical and hands-gloves. Patient was placed on no precaution(s). Patient was masked. Precautions:   Patient currently on None  Patient currently roomed with door closed. Is someone accompanying this pt? no    Is the patient using any DME equipment during OV? no    Depression Screening:  PHQ-9 Questionaire 2022   Little interest or pleasure in doing things 0 0 0 0   Feeling down, depressed, or hopeless 0 0 0 0   PHQ-9 Total Score 0 0 0 0        Learning Assessment:  No question data found. Abuse Screening: AMB Abuse Screening 3/14/2023   Do you ever feel afraid of your partner? N   Are you in a relationship with someone who physically or mentally threatens you? N   Is it safe for you to go home? Y          Fall Risk  Fall Risk 3/14/2023   2 or more falls in past year? no   Fall with injury in past year? no         Pt currently taking Anticoagulant therapy? yes  Pt currently taking Antiplatelet therapy? no    Coordination of Care:  1. Have you been to the ER, urgent care clinic since your last visit? Hospitalized since your last visit? no    2. Have you seen or consulted any other health care providers outside of the [address] [place] [address] [place] since your last visit? Include any pap smears or colon screening. no      Please see Red banners under Allergies and Med Rec to remove outside inquires.  All correct information has been verified with patient and added to chart.     Medication's patient's would liked removed has been marked not taking to be removed per Verbal order and read back per Daniel Wren MD

## 2023-03-14 NOTE — PATIENT INSTRUCTIONS
New Medication/Medication Changes  Leqvio injection- [facility] will contact you for a date and time for injection     **please allow 24-48 hrs for medication to be escribed to pharmacy** If you need any refills on medications please contact your pharmacy so that the request can be escribed to the provider for review seven to 10 days prior to being out of medication.             New Location Address- projected for the month of May 2023    [address] [facility]  [address] [facility] [address], [place] [address]

## 2023-04-04 SDOH — ECONOMIC STABILITY: FOOD INSECURITY: WITHIN THE PAST 12 MONTHS, THE FOOD YOU BOUGHT JUST DIDN'T LAST AND YOU DIDN'T HAVE MONEY TO GET MORE.: PATIENT DECLINED

## 2023-04-04 SDOH — ECONOMIC STABILITY: INCOME INSECURITY: HOW HARD IS IT FOR YOU TO PAY FOR THE VERY BASICS LIKE FOOD, HOUSING, MEDICAL CARE, AND HEATING?: PATIENT DECLINED

## 2023-04-04 SDOH — ECONOMIC STABILITY: FOOD INSECURITY: WITHIN THE PAST 12 MONTHS, YOU WORRIED THAT YOUR FOOD WOULD RUN OUT BEFORE YOU GOT MONEY TO BUY MORE.: PATIENT DECLINED

## 2023-04-07 ENCOUNTER — OFFICE VISIT (OUTPATIENT)
Facility: CLINIC | Age: 58
End: 2023-04-07

## 2023-04-07 ENCOUNTER — TELEPHONE (OUTPATIENT)
Facility: CLINIC | Age: 58
End: 2023-04-07

## 2023-04-07 VITALS
BODY MASS INDEX: 43.02 KG/M2 | RESPIRATION RATE: 22 BRPM | WEIGHT: 252 LBS | SYSTOLIC BLOOD PRESSURE: 132 MMHG | HEART RATE: 93 BPM | HEIGHT: 64 IN | OXYGEN SATURATION: 97 % | DIASTOLIC BLOOD PRESSURE: 85 MMHG | TEMPERATURE: 97.2 F

## 2023-04-07 DIAGNOSIS — Z71.3 DIETARY COUNSELING AND SURVEILLANCE: ICD-10-CM

## 2023-04-07 DIAGNOSIS — Z79.4 TYPE 2 DIABETES MELLITUS WITH HYPERGLYCEMIA, WITH LONG-TERM CURRENT USE OF INSULIN (HCC): Primary | ICD-10-CM

## 2023-04-07 DIAGNOSIS — Z71.82 EXERCISE COUNSELING: ICD-10-CM

## 2023-04-07 DIAGNOSIS — E66.01 CLASS 3 SEVERE OBESITY DUE TO EXCESS CALORIES WITH BODY MASS INDEX (BMI) OF 40.0 TO 44.9 IN ADULT, UNSPECIFIED WHETHER SERIOUS COMORBIDITY PRESENT (HCC): ICD-10-CM

## 2023-04-07 DIAGNOSIS — E55.9 VITAMIN D DEFICIENCY, UNSPECIFIED: ICD-10-CM

## 2023-04-07 DIAGNOSIS — Z13.89 OBESITY SCREEN: ICD-10-CM

## 2023-04-07 DIAGNOSIS — E78.2 MIXED HYPERLIPIDEMIA: ICD-10-CM

## 2023-04-07 DIAGNOSIS — E11.65 TYPE 2 DIABETES MELLITUS WITH HYPERGLYCEMIA, WITH LONG-TERM CURRENT USE OF INSULIN (HCC): Primary | ICD-10-CM

## 2023-04-07 DIAGNOSIS — I10 ESSENTIAL (PRIMARY) HYPERTENSION: ICD-10-CM

## 2023-04-07 DIAGNOSIS — G43.819 OTHER MIGRAINE WITHOUT STATUS MIGRAINOSUS, INTRACTABLE: ICD-10-CM

## 2023-04-07 DIAGNOSIS — Z12.31 SCREENING MAMMOGRAM FOR BREAST CANCER: ICD-10-CM

## 2023-04-07 LAB
GLUCOSE, POC: 255 MG/DL
HBA1C MFR BLD: 9.7 %

## 2023-04-07 RX ORDER — ERGOCALCIFEROL 1.25 MG/1
50000 CAPSULE ORAL
Qty: 4 CAPSULE | Refills: 0 | Status: SHIPPED | OUTPATIENT
Start: 2023-04-07

## 2023-04-07 RX ORDER — EVOLOCUMAB 140 MG/ML
140 INJECTION, SOLUTION SUBCUTANEOUS
Qty: 2.1 ML | Refills: 3 | Status: SHIPPED | OUTPATIENT
Start: 2023-04-07

## 2023-04-07 RX ORDER — DULAGLUTIDE 3 MG/.5ML
3 INJECTION, SOLUTION SUBCUTANEOUS WEEKLY
Qty: 4 ADJUSTABLE DOSE PRE-FILLED PEN SYRINGE | Refills: 0 | Status: SHIPPED | OUTPATIENT
Start: 2023-04-07

## 2023-04-07 RX ORDER — TOPIRAMATE 100 MG/1
100 TABLET, FILM COATED ORAL DAILY
Qty: 90 TABLET | Refills: 0 | Status: SHIPPED | OUTPATIENT
Start: 2023-04-07

## 2023-04-07 SDOH — ECONOMIC STABILITY: FOOD INSECURITY: WITHIN THE PAST 12 MONTHS, YOU WORRIED THAT YOUR FOOD WOULD RUN OUT BEFORE YOU GOT MONEY TO BUY MORE.: NEVER TRUE

## 2023-04-07 SDOH — ECONOMIC STABILITY: FOOD INSECURITY: WITHIN THE PAST 12 MONTHS, THE FOOD YOU BOUGHT JUST DIDN'T LAST AND YOU DIDN'T HAVE MONEY TO GET MORE.: NEVER TRUE

## 2023-04-07 SDOH — ECONOMIC STABILITY: INCOME INSECURITY: HOW HARD IS IT FOR YOU TO PAY FOR THE VERY BASICS LIKE FOOD, HOUSING, MEDICAL CARE, AND HEATING?: NOT HARD AT ALL

## 2023-04-07 ASSESSMENT — ENCOUNTER SYMPTOMS
ABDOMINAL PAIN: 0
SHORTNESS OF BREATH: 0

## 2023-04-07 ASSESSMENT — PATIENT HEALTH QUESTIONNAIRE - PHQ9
SUM OF ALL RESPONSES TO PHQ9 QUESTIONS 1 & 2: 0
SUM OF ALL RESPONSES TO PHQ QUESTIONS 1-9: 0
2. FEELING DOWN, DEPRESSED OR HOPELESS: 0
SUM OF ALL RESPONSES TO PHQ QUESTIONS 1-9: 0
1. LITTLE INTEREST OR PLEASURE IN DOING THINGS: 0
SUM OF ALL RESPONSES TO PHQ QUESTIONS 1-9: 0
SUM OF ALL RESPONSES TO PHQ QUESTIONS 1-9: 0

## 2023-04-07 NOTE — PROGRESS NOTES
Harles Duane is a 60 y.o. female (: 1965) presenting to address:    Chief Complaint   Patient presents with    Diabetes       /85   Pulse 93   Temp 97.2 °F (36.2 °C) (Skin)   Resp 22   Ht 5' 4\" (1.626 m)   Wt 252 lb (114.3 kg)   SpO2 97%   BMI 43.26 kg/m²    No flowsheet data found. Hearing/Vision:   No results found. Learning Assessment:   No flowsheet data found. Depression Screening:   No flowsheet data found. Fall Risk Assessment:   No flowsheet data found. Abuse Screening:   No flowsheet data found. Coordination of Care Questionaire:     Advanced Directive:   1. Do you have an Advanced Directive? No    2. Would you like information on Advanced Directives? No    1. \"Have you been to the ER, urgent care clinic since your last visit? Hospitalized since your last visit? \" No    2. \"Have you seen or consulted any other health care providers outside of the [address] [place] [address] since your last visit? \" No    3. For patients aged 39-70: Has the patient had a colonoscopy? No    If the patient is female:    4. For patients aged 41-77: Has the patient had a mammogram within the past 2 years? No    5. For patients aged 21-65: Has the patient had a pap smear?  No
body mass index (BMI) of 40.0 to 44.9 in adult, unspecified whether serious comorbidity present ([facility]  E66.01     Z68.41       3. Essential (primary) hypertension  I10       4. Mixed hyperlipidemia  E78.2 Evolocumab (REPATHA) SOSY syringe      5. Other migraine without status migrainosus, intractable  G43.819 topiramate (TOPAMAX) 100 MG tablet      6. Vitamin D deficiency, unspecified  E55.9 ergocalciferol (ERGOCALCIFEROL) 1.25 MG (33869 UT) capsule      7. Screening mammogram for breast cancer  Z12.31 DAO DIGITAL SCREEN W OR WO CAD BILATERAL      8. Exercise counseling  Z71.82       9. Dietary counseling and surveillance  Z71.3       10. Obesity screen  Z13.89       POC A1C 9.7 & Glu 255 today. Continue Increasing Trulicity to 3mg, stopping Januva and replacing with jardiance 10mg. Discussed diabetes education including diet exercise and medication management. Stressed importance of checking BS 3x daily before meals. Ordered glucometer,test strips and lancets. Pt is to bring log at next visit. BP well controlled on Ibersartan, will continue. Discussed importance of limiting sodium in diet and getting 150min of exercise a day. Pt can not tolerate statins. Will order Repatha. Discussed SE of medication    Migraines controlled on Topamax    Reviewed recent notes, labs and imaging from previous providers    Return in about 3 months (around 7/7/2023) for T2D, HTN, HLD.

## 2023-04-20 ENCOUNTER — TRANSCRIBE ORDERS (OUTPATIENT)
Facility: HOSPITAL | Age: 58
End: 2023-04-20

## 2023-04-20 DIAGNOSIS — Z12.31 VISIT FOR SCREENING MAMMOGRAM: Primary | ICD-10-CM

## 2023-04-25 DIAGNOSIS — E78.2 MIXED HYPERLIPIDEMIA: ICD-10-CM

## 2023-04-25 DIAGNOSIS — I10 ESSENTIAL (PRIMARY) HYPERTENSION: ICD-10-CM

## 2023-04-25 DIAGNOSIS — E11.65 TYPE 2 DIABETES MELLITUS WITH HYPERGLYCEMIA, WITH LONG-TERM CURRENT USE OF INSULIN (HCC): Primary | ICD-10-CM

## 2023-04-25 DIAGNOSIS — Z79.4 TYPE 2 DIABETES MELLITUS WITH HYPERGLYCEMIA, WITH LONG-TERM CURRENT USE OF INSULIN (HCC): Primary | ICD-10-CM

## 2023-04-25 DIAGNOSIS — E55.9 VITAMIN D DEFICIENCY, UNSPECIFIED: ICD-10-CM

## 2023-05-11 ENCOUNTER — HOSPITAL ENCOUNTER (OUTPATIENT)
Facility: HOSPITAL | Age: 58
Setting detail: SPECIMEN
Discharge: HOME | End: 2023-05-11
Payer: COMMERCIAL

## 2023-05-11 DIAGNOSIS — Z79.4 TYPE 2 DIABETES MELLITUS WITH HYPERGLYCEMIA, WITH LONG-TERM CURRENT USE OF INSULIN (HCC): ICD-10-CM

## 2023-05-11 DIAGNOSIS — E55.9 VITAMIN D DEFICIENCY, UNSPECIFIED: ICD-10-CM

## 2023-05-11 DIAGNOSIS — E11.65 TYPE 2 DIABETES MELLITUS WITH HYPERGLYCEMIA, WITH LONG-TERM CURRENT USE OF INSULIN (HCC): ICD-10-CM

## 2023-05-11 DIAGNOSIS — I10 ESSENTIAL (PRIMARY) HYPERTENSION: ICD-10-CM

## 2023-05-11 DIAGNOSIS — E78.2 MIXED HYPERLIPIDEMIA: ICD-10-CM

## 2023-05-11 LAB
25(OH)D3 SERPL-MCNC: 63.2 NG/ML (ref 30–100)
ALBUMIN SERPL-MCNC: 3.8 G/DL (ref 3.4–5)
ALBUMIN/GLOB SERPL: 1.3 (ref 0.8–1.7)
ALP SERPL-CCNC: 168 U/L (ref 45–117)
ALT SERPL-CCNC: 47 U/L (ref 13–56)
ANION GAP SERPL CALC-SCNC: 7 MMOL/L (ref 3–18)
AST SERPL-CCNC: 23 U/L (ref 10–38)
BILIRUB SERPL-MCNC: 0.6 MG/DL (ref 0.2–1)
BUN SERPL-MCNC: 17 MG/DL (ref 7–18)
BUN/CREAT SERPL: 25 (ref 12–20)
CALCIUM SERPL-MCNC: 9 MG/DL (ref 8.5–10.1)
CHLORIDE SERPL-SCNC: 111 MMOL/L (ref 100–111)
CHOLEST SERPL-MCNC: 200 MG/DL
CO2 SERPL-SCNC: 23 MMOL/L (ref 21–32)
CREAT SERPL-MCNC: 0.68 MG/DL (ref 0.6–1.3)
GLOBULIN SER CALC-MCNC: 3 G/DL (ref 2–4)
GLUCOSE SERPL-MCNC: 253 MG/DL (ref 74–99)
HDLC SERPL-MCNC: 51 MG/DL (ref 40–60)
HDLC SERPL: 3.9 (ref 0–5)
LDLC SERPL CALC-MCNC: 126.4 MG/DL (ref 0–100)
LIPID PANEL: ABNORMAL
POTASSIUM SERPL-SCNC: 3.6 MMOL/L (ref 3.5–5.5)
PROT SERPL-MCNC: 6.8 G/DL (ref 6.4–8.2)
SODIUM SERPL-SCNC: 141 MMOL/L (ref 136–145)
TRIGL SERPL-MCNC: 113 MG/DL
VLDLC SERPL CALC-MCNC: 22.6 MG/DL

## 2023-05-11 PROCEDURE — 82306 VITAMIN D 25 HYDROXY: CPT

## 2023-05-11 PROCEDURE — 36415 COLL VENOUS BLD VENIPUNCTURE: CPT

## 2023-05-11 PROCEDURE — 80053 COMPREHEN METABOLIC PANEL: CPT

## 2023-05-11 PROCEDURE — 80061 LIPID PANEL: CPT

## 2023-05-16 DIAGNOSIS — Z79.4 TYPE 2 DIABETES MELLITUS WITH HYPERGLYCEMIA, WITH LONG-TERM CURRENT USE OF INSULIN (HCC): ICD-10-CM

## 2023-05-16 DIAGNOSIS — E11.65 TYPE 2 DIABETES MELLITUS WITH HYPERGLYCEMIA, WITH LONG-TERM CURRENT USE OF INSULIN (HCC): ICD-10-CM

## 2023-05-17 DIAGNOSIS — E78.2 MIXED HYPERLIPIDEMIA: ICD-10-CM

## 2023-05-17 RX ORDER — EVOLOCUMAB 140 MG/ML
140 INJECTION, SOLUTION SUBCUTANEOUS
Qty: 2.1 ML | Refills: 3 | Status: SHIPPED | OUTPATIENT
Start: 2023-05-17

## 2023-05-17 RX ORDER — DULAGLUTIDE 3 MG/.5ML
3 INJECTION, SOLUTION SUBCUTANEOUS WEEKLY
Qty: 4 ADJUSTABLE DOSE PRE-FILLED PEN SYRINGE | Refills: 0 | Status: SHIPPED | OUTPATIENT
Start: 2023-05-17

## 2023-06-24 DIAGNOSIS — E11.65 TYPE 2 DIABETES MELLITUS WITH HYPERGLYCEMIA, WITH LONG-TERM CURRENT USE OF INSULIN (HCC): ICD-10-CM

## 2023-06-24 DIAGNOSIS — G43.819 OTHER MIGRAINE WITHOUT STATUS MIGRAINOSUS, INTRACTABLE: ICD-10-CM

## 2023-06-24 DIAGNOSIS — Z79.4 TYPE 2 DIABETES MELLITUS WITH HYPERGLYCEMIA, WITH LONG-TERM CURRENT USE OF INSULIN (HCC): ICD-10-CM

## 2023-06-27 DIAGNOSIS — E11.65 TYPE 2 DIABETES MELLITUS WITH HYPERGLYCEMIA, WITH LONG-TERM CURRENT USE OF INSULIN (HCC): ICD-10-CM

## 2023-06-27 DIAGNOSIS — Z79.4 TYPE 2 DIABETES MELLITUS WITH HYPERGLYCEMIA, WITH LONG-TERM CURRENT USE OF INSULIN (HCC): ICD-10-CM

## 2023-06-27 DIAGNOSIS — G43.819 OTHER MIGRAINE WITHOUT STATUS MIGRAINOSUS, INTRACTABLE: ICD-10-CM

## 2023-06-27 RX ORDER — TOPIRAMATE 100 MG/1
100 TABLET, FILM COATED ORAL DAILY
Qty: 90 TABLET | Refills: 0 | OUTPATIENT
Start: 2023-06-27

## 2023-06-27 RX ORDER — DULAGLUTIDE 3 MG/.5ML
3 INJECTION, SOLUTION SUBCUTANEOUS WEEKLY
Qty: 4 ADJUSTABLE DOSE PRE-FILLED PEN SYRINGE | Refills: 0 | Status: SHIPPED | OUTPATIENT
Start: 2023-06-27 | End: 2023-06-28

## 2023-06-27 RX ORDER — TOPIRAMATE 100 MG/1
100 TABLET, FILM COATED ORAL DAILY
Qty: 90 TABLET | Refills: 0 | Status: SHIPPED | OUTPATIENT
Start: 2023-06-27 | End: 2023-06-28

## 2023-06-27 RX ORDER — DULAGLUTIDE 3 MG/.5ML
3 INJECTION, SOLUTION SUBCUTANEOUS WEEKLY
Qty: 4 ADJUSTABLE DOSE PRE-FILLED PEN SYRINGE | Refills: 0 | OUTPATIENT
Start: 2023-06-27

## 2023-06-28 RX ORDER — TOPIRAMATE 100 MG/1
100 TABLET, FILM COATED ORAL DAILY
Qty: 90 TABLET | Refills: 0 | Status: SHIPPED | OUTPATIENT
Start: 2023-06-28

## 2023-06-28 RX ORDER — DULAGLUTIDE 3 MG/.5ML
3 INJECTION, SOLUTION SUBCUTANEOUS WEEKLY
Qty: 4 ADJUSTABLE DOSE PRE-FILLED PEN SYRINGE | Refills: 0 | Status: SHIPPED | OUTPATIENT
Start: 2023-06-28

## 2023-09-28 DIAGNOSIS — G43.819 OTHER MIGRAINE WITHOUT STATUS MIGRAINOSUS, INTRACTABLE: ICD-10-CM

## 2023-09-28 DIAGNOSIS — Z79.4 TYPE 2 DIABETES MELLITUS WITH HYPERGLYCEMIA, WITH LONG-TERM CURRENT USE OF INSULIN (HCC): ICD-10-CM

## 2023-09-28 DIAGNOSIS — E11.65 TYPE 2 DIABETES MELLITUS WITH HYPERGLYCEMIA, WITH LONG-TERM CURRENT USE OF INSULIN (HCC): ICD-10-CM

## 2023-09-28 RX ORDER — IRBESARTAN 300 MG/1
TABLET ORAL
Qty: 90 TABLET | Refills: 0 | Status: SHIPPED | OUTPATIENT
Start: 2023-09-28

## 2023-09-28 RX ORDER — TOPIRAMATE 100 MG/1
100 TABLET, FILM COATED ORAL DAILY
Qty: 90 TABLET | Refills: 0 | Status: SHIPPED | OUTPATIENT
Start: 2023-09-28

## 2023-09-28 RX ORDER — EMPAGLIFLOZIN 10 MG/1
10 TABLET, FILM COATED ORAL DAILY
Qty: 90 TABLET | Refills: 0 | Status: SHIPPED | OUTPATIENT
Start: 2023-09-28

## 2023-11-01 RX ORDER — APIXABAN 5 MG/1
5 TABLET, FILM COATED ORAL 2 TIMES DAILY
Qty: 180 TABLET | Refills: 0 | Status: SHIPPED | OUTPATIENT
Start: 2023-11-01

## 2023-12-14 ENCOUNTER — OFFICE VISIT (OUTPATIENT)
Age: 58
End: 2023-12-14
Payer: COMMERCIAL

## 2023-12-14 VITALS
WEIGHT: 236 LBS | HEART RATE: 92 BPM | DIASTOLIC BLOOD PRESSURE: 85 MMHG | SYSTOLIC BLOOD PRESSURE: 151 MMHG | BODY MASS INDEX: 40.51 KG/M2 | OXYGEN SATURATION: 99 %

## 2023-12-14 DIAGNOSIS — I10 ESSENTIAL HYPERTENSION WITH GOAL BLOOD PRESSURE LESS THAN 140/90: Primary | ICD-10-CM

## 2023-12-14 PROCEDURE — 93000 ELECTROCARDIOGRAM COMPLETE: CPT | Performed by: INTERNAL MEDICINE

## 2023-12-14 PROCEDURE — 99214 OFFICE O/P EST MOD 30 MIN: CPT | Performed by: INTERNAL MEDICINE

## 2023-12-14 PROCEDURE — 3079F DIAST BP 80-89 MM HG: CPT | Performed by: INTERNAL MEDICINE

## 2023-12-14 PROCEDURE — 3077F SYST BP >= 140 MM HG: CPT | Performed by: INTERNAL MEDICINE

## 2023-12-14 RX ORDER — EVOLOCUMAB 140 MG/ML
140 INJECTION, SOLUTION SUBCUTANEOUS
Qty: 2 ADJUSTABLE DOSE PRE-FILLED PEN SYRINGE | Refills: 5 | Status: SHIPPED | OUTPATIENT
Start: 2023-12-14

## 2023-12-14 NOTE — PROGRESS NOTES
Identified pt with two pt identifiers(name and ). Reviewed record in preparation for visit and have obtained necessary documentation. Michael Gerber presents today for   Chief Complaint   Patient presents with    Follow-up       Pt c/o  HEADACHES, SWELLING. Michael Gerber preferred language for health care discussion is english/other. Personal Protective Equipment:   Personal Protective Equipment was used including: mask-surgical and hands-gloves. Patient was placed on no precaution(s). Patient was not masked. Precautions:   Patient currently on None  Patient currently roomed with door closed. Is someone accompanying this pt? no    Is the patient using any DME equipment during OV? no    Depression Screenin/7/2023    12:21 PM 2022     9:02 AM 2022     3:52 PM 2022     1:41 PM 2022    10:51 AM   PHQ-9 Questionaire   Little interest or pleasure in doing things 0 0 0 0 0   Feeling down, depressed, or hopeless 0 0 0 0 0   PHQ-9 Total Score 0 0 0 0 0        Learning Assessment:  No question data found. Abuse Screenin/14/2023     9:00 AM 3/14/2023     9:00 AM   AMB Abuse Screening   Do you ever feel afraid of your partner? N N   Are you in a relationship with someone who physically or mentally threatens you? N N   Is it safe for you to go home? Cande Khane          Fall Risk      2023     9:58 AM 3/14/2023     9:00 AM   Fall Risk   2 or more falls in past year? no no   Fall with injury in past year? no no         Pt currently taking Anticoagulant therapy? no  Pt currently taking Antiplatelet therapy? no    Coordination of Care:  1. Have you been to the ER, urgent care clinic since your last visit? Hospitalized since your last visit? no    2. Have you seen or consulted any other health care providers outside of the [address] [place] [address] since your last visit? Include any pap smears or colon screening.  no      Please see Red banners under

## 2023-12-14 NOTE — PROGRESS NOTES
[facility]    Kartik De La Rosa is 60 y.o. female with a history of atrial fibrillation status post ablation, CVA, diabetes, hypertension, hyperlipidemia, Sjogren's syndrome and other medical problems    Patient denies any prior history of MI or CHF. Patient  was diagnosed with atrial fibrillation approximately in 2015. Patient had EP study and A. fib ablation successfully in 2016 in [facility]. Patient is here today for follow-up appointment for atrial fibrillation  Denies any hospital admission or ER visit. Denies any new medication  Denies any resting or exertional chest pain or chest pressure to suggest angina or any dyspnea to suggest heart failure. No presyncope or syncope  Denies any PND or LE edema. Taking all medications regularly. Denies any nausea, vomiting, abdominal pain, fever, chills, sputum production. No hematuria or other bleeding complaints       Past Medical History:   Diagnosis Date    Atrial fibrillation ([facility])     S/P Ablatioin in 2016 at [address] [place] [address],[address] [id number] 04/27/2022    Patient requesting Full Sedation for MRI    CVA (cerebral vascular accident) ([facility])     Diabetes mellitus, type 2 ([facility])     Family history of early CAD     Father at 40, Brother at 44    GERD (gastroesophageal reflux disease)     Goiter     H/O lupus anticoagulant disorder     HLD (hyperlipidemia)     HTN (hypertension)     Migraines     Reportedly without Aura    BOB (nonalcoholic steatohepatitis)     Sjogren's syndrome ([facility])        Review of Systems:  Cardiac symptoms as noted above in HPI. All others negative. Denies fatigue, malaise, skin rash, joint pain, blurring vision, photophobia, neck pain, hemoptysis, chronic cough, nausea, vomiting, hematuria, burning micturition, BRBPR, chronic headaches.     Current Outpatient Medications   Medication Sig    ELIQUIS 5 MG TABS tablet Take 1 tablet by mouth twice daily

## 2024-01-18 ENCOUNTER — OFFICE VISIT (OUTPATIENT)
Facility: CLINIC | Age: 59
End: 2024-01-18

## 2024-01-18 VITALS
RESPIRATION RATE: 20 BRPM | HEIGHT: 64 IN | HEART RATE: 92 BPM | OXYGEN SATURATION: 97 % | TEMPERATURE: 97 F | DIASTOLIC BLOOD PRESSURE: 82 MMHG | WEIGHT: 240 LBS | SYSTOLIC BLOOD PRESSURE: 128 MMHG | BODY MASS INDEX: 40.97 KG/M2

## 2024-01-18 DIAGNOSIS — E11.65 TYPE 2 DIABETES MELLITUS WITH HYPERGLYCEMIA, WITH LONG-TERM CURRENT USE OF INSULIN (HCC): Primary | ICD-10-CM

## 2024-01-18 DIAGNOSIS — E66.01 CLASS 3 SEVERE OBESITY DUE TO EXCESS CALORIES WITH BODY MASS INDEX (BMI) OF 40.0 TO 44.9 IN ADULT, UNSPECIFIED WHETHER SERIOUS COMORBIDITY PRESENT (HCC): ICD-10-CM

## 2024-01-18 DIAGNOSIS — E78.2 MIXED HYPERLIPIDEMIA: ICD-10-CM

## 2024-01-18 DIAGNOSIS — Z79.4 TYPE 2 DIABETES MELLITUS WITH HYPERGLYCEMIA, WITH LONG-TERM CURRENT USE OF INSULIN (HCC): Primary | ICD-10-CM

## 2024-01-18 DIAGNOSIS — I10 ESSENTIAL (PRIMARY) HYPERTENSION: ICD-10-CM

## 2024-01-18 DIAGNOSIS — Z13.89 OBESITY SCREEN: ICD-10-CM

## 2024-01-18 DIAGNOSIS — Z78.9 STATIN INTOLERANCE: ICD-10-CM

## 2024-01-18 LAB — HBA1C MFR BLD: 10.2 %

## 2024-01-18 RX ORDER — GLIPIZIDE AND METFORMIN HCL 5; 500 MG/1; MG/1
1 TABLET, FILM COATED ORAL
Qty: 180 TABLET | Refills: 0 | Status: SHIPPED | OUTPATIENT
Start: 2024-01-18

## 2024-01-18 RX ORDER — GLIPIZIDE AND METFORMIN HCL 5; 500 MG/1; MG/1
1 TABLET, FILM COATED ORAL
Qty: 60 TABLET | Refills: 3 | Status: SHIPPED | OUTPATIENT
Start: 2024-01-18 | End: 2024-01-18

## 2024-01-18 ASSESSMENT — PATIENT HEALTH QUESTIONNAIRE - PHQ9
SUM OF ALL RESPONSES TO PHQ QUESTIONS 1-9: 0
1. LITTLE INTEREST OR PLEASURE IN DOING THINGS: 0
SUM OF ALL RESPONSES TO PHQ QUESTIONS 1-9: 0
1. LITTLE INTEREST OR PLEASURE IN DOING THINGS: 0
SUM OF ALL RESPONSES TO PHQ9 QUESTIONS 1 & 2: 0
2. FEELING DOWN, DEPRESSED OR HOPELESS: 0
SUM OF ALL RESPONSES TO PHQ9 QUESTIONS 1 & 2: 0
SUM OF ALL RESPONSES TO PHQ QUESTIONS 1-9: 0
2. FEELING DOWN, DEPRESSED OR HOPELESS: 0

## 2024-01-18 ASSESSMENT — ENCOUNTER SYMPTOMS
SHORTNESS OF BREATH: 0
ABDOMINAL PAIN: 0

## 2024-01-18 NOTE — PROGRESS NOTES
1. \"Have you been to the ER, urgent care clinic since your last visit?  Hospitalized since your last visit?\" No    2. \"Have you seen or consulted any other health care providers outside of the [facility] since your last visit?\" No    3. For patients aged 45-75: Has the patient had a colonoscopy / FIT/ Cologuard? No      If the patient is female:    4. For patients aged 40-74: Has the patient had a mammogram within the past 2 years? No      5. For patients aged 21-65: Has the patient had a pap smear? No  
due to excess calories with body mass index (BMI) of 40.0 to 44.9 in adult, unspecified whether serious comorbidity present (HCC)  E66.01     Z68.41       6. Obesity screen  Z13.89       T2D-Chronic  -Uncontrolled. Reviewed POC A1C 10.2 today.   -Starting Metaglip 5-500mg BID. Discussed SE of medication. Continue current dose of Jardiance.   --Discussed diabetes education including diet exercise and medication management. Discussed medications that interfere with blood sugar.  -Stressed importance of checking BS 2x daily before meals.   -Referral to endo for further evaluation  -Pt is to bring log at next visit.    HTN-Chronic  -Continue current dose of Imdur.   -Encouraged to check BP at home  -Discussed importance of limiting sodium in diet and getting 150min of exercise a week.     HLD-Chronic  -Statin intolerant  -Defer tx to cardio    Obesity-Chronic  -Encouraged to start a successful weight loss plan. Discussed BMI/weight, lifestyle, diet and exercise. Discussed effect on blood pressure, blood sugar, and joints especially. Focus on limiting white carbs, portion control, and moving more.   -Discussed for at least 10 min.       Return in about 1 month (around 2/18/2024) for T2D,HTN, A1C.

## 2024-03-25 DIAGNOSIS — E11.65 TYPE 2 DIABETES MELLITUS WITH HYPERGLYCEMIA, WITH LONG-TERM CURRENT USE OF INSULIN (HCC): ICD-10-CM

## 2024-03-25 DIAGNOSIS — Z79.4 TYPE 2 DIABETES MELLITUS WITH HYPERGLYCEMIA, WITH LONG-TERM CURRENT USE OF INSULIN (HCC): ICD-10-CM

## 2024-03-25 DIAGNOSIS — G43.819 OTHER MIGRAINE WITHOUT STATUS MIGRAINOSUS, INTRACTABLE: ICD-10-CM

## 2024-03-26 RX ORDER — TOPIRAMATE 100 MG/1
100 TABLET, FILM COATED ORAL DAILY
Qty: 90 TABLET | Refills: 0 | Status: SHIPPED | OUTPATIENT
Start: 2024-03-26

## 2024-03-26 RX ORDER — EMPAGLIFLOZIN 10 MG/1
10 TABLET, FILM COATED ORAL DAILY
Qty: 90 TABLET | Refills: 0 | Status: SHIPPED | OUTPATIENT
Start: 2024-03-26

## 2024-03-26 RX ORDER — IRBESARTAN 300 MG/1
300 TABLET ORAL DAILY
Qty: 90 TABLET | Refills: 0 | Status: SHIPPED | OUTPATIENT
Start: 2024-03-26

## 2024-05-06 RX ORDER — APIXABAN 5 MG/1
5 TABLET, FILM COATED ORAL 2 TIMES DAILY
Qty: 180 TABLET | Refills: 0 | Status: SHIPPED | OUTPATIENT
Start: 2024-05-06

## 2024-05-06 NOTE — TELEPHONE ENCOUNTER
Last Appointment:  1/18/2024  Future Appointments   Date Time Provider Department Center   12/17/2024 10:45 AM Misbah Graham MD [facility] BS AMB      Self scheduling appointment sent via [employer].

## 2024-06-17 DIAGNOSIS — E11.65 TYPE 2 DIABETES MELLITUS WITH HYPERGLYCEMIA, WITH LONG-TERM CURRENT USE OF INSULIN (HCC): ICD-10-CM

## 2024-06-17 DIAGNOSIS — G43.819 OTHER MIGRAINE WITHOUT STATUS MIGRAINOSUS, INTRACTABLE: ICD-10-CM

## 2024-06-17 DIAGNOSIS — Z79.4 TYPE 2 DIABETES MELLITUS WITH HYPERGLYCEMIA, WITH LONG-TERM CURRENT USE OF INSULIN (HCC): ICD-10-CM

## 2024-06-18 RX ORDER — EMPAGLIFLOZIN 10 MG/1
10 TABLET, FILM COATED ORAL DAILY
Qty: 90 TABLET | Refills: 0 | Status: SHIPPED | OUTPATIENT
Start: 2024-06-18

## 2024-06-18 RX ORDER — IRBESARTAN 300 MG/1
300 TABLET ORAL DAILY
Qty: 90 TABLET | Refills: 0 | Status: SHIPPED | OUTPATIENT
Start: 2024-06-18

## 2024-06-18 RX ORDER — TOPIRAMATE 100 MG/1
100 TABLET, FILM COATED ORAL DAILY
Qty: 90 TABLET | Refills: 0 | Status: SHIPPED | OUTPATIENT
Start: 2024-06-18

## 2024-06-26 SDOH — ECONOMIC STABILITY: INCOME INSECURITY: HOW HARD IS IT FOR YOU TO PAY FOR THE VERY BASICS LIKE FOOD, HOUSING, MEDICAL CARE, AND HEATING?: PATIENT DECLINED

## 2024-06-26 SDOH — ECONOMIC STABILITY: FOOD INSECURITY: WITHIN THE PAST 12 MONTHS, THE FOOD YOU BOUGHT JUST DIDN'T LAST AND YOU DIDN'T HAVE MONEY TO GET MORE.: PATIENT DECLINED

## 2024-06-26 SDOH — ECONOMIC STABILITY: FOOD INSECURITY: WITHIN THE PAST 12 MONTHS, YOU WORRIED THAT YOUR FOOD WOULD RUN OUT BEFORE YOU GOT MONEY TO BUY MORE.: PATIENT DECLINED

## 2024-06-27 ENCOUNTER — OFFICE VISIT (OUTPATIENT)
Facility: CLINIC | Age: 59
End: 2024-06-27
Payer: COMMERCIAL

## 2024-06-27 VITALS
WEIGHT: 258 LBS | SYSTOLIC BLOOD PRESSURE: 150 MMHG | OXYGEN SATURATION: 97 % | DIASTOLIC BLOOD PRESSURE: 80 MMHG | RESPIRATION RATE: 20 BRPM | HEART RATE: 83 BPM | BODY MASS INDEX: 44.05 KG/M2 | HEIGHT: 64 IN | TEMPERATURE: 97.2 F

## 2024-06-27 DIAGNOSIS — Z78.9 STATIN INTOLERANCE: ICD-10-CM

## 2024-06-27 DIAGNOSIS — E78.2 MIXED HYPERLIPIDEMIA: ICD-10-CM

## 2024-06-27 DIAGNOSIS — I10 ESSENTIAL (PRIMARY) HYPERTENSION: ICD-10-CM

## 2024-06-27 DIAGNOSIS — Z13.89 OBESITY SCREEN: ICD-10-CM

## 2024-06-27 DIAGNOSIS — I48.91 ATRIAL FIBRILLATION, UNSPECIFIED TYPE (HCC): ICD-10-CM

## 2024-06-27 DIAGNOSIS — E66.01 CLASS 3 SEVERE OBESITY DUE TO EXCESS CALORIES WITH BODY MASS INDEX (BMI) OF 40.0 TO 44.9 IN ADULT, UNSPECIFIED WHETHER SERIOUS COMORBIDITY PRESENT (HCC): ICD-10-CM

## 2024-06-27 DIAGNOSIS — E11.65 TYPE 2 DIABETES MELLITUS WITH HYPERGLYCEMIA, WITH LONG-TERM CURRENT USE OF INSULIN (HCC): Primary | ICD-10-CM

## 2024-06-27 DIAGNOSIS — Z79.4 TYPE 2 DIABETES MELLITUS WITH HYPERGLYCEMIA, WITH LONG-TERM CURRENT USE OF INSULIN (HCC): Primary | ICD-10-CM

## 2024-06-27 LAB — HBA1C MFR BLD: 7.3 %

## 2024-06-27 PROCEDURE — 99214 OFFICE O/P EST MOD 30 MIN: CPT | Performed by: STUDENT IN AN ORGANIZED HEALTH CARE EDUCATION/TRAINING PROGRAM

## 2024-06-27 PROCEDURE — 93000 ELECTROCARDIOGRAM COMPLETE: CPT | Performed by: STUDENT IN AN ORGANIZED HEALTH CARE EDUCATION/TRAINING PROGRAM

## 2024-06-27 PROCEDURE — 3079F DIAST BP 80-89 MM HG: CPT | Performed by: STUDENT IN AN ORGANIZED HEALTH CARE EDUCATION/TRAINING PROGRAM

## 2024-06-27 PROCEDURE — 99401 PREV MED CNSL INDIV APPRX 15: CPT | Performed by: STUDENT IN AN ORGANIZED HEALTH CARE EDUCATION/TRAINING PROGRAM

## 2024-06-27 PROCEDURE — 3077F SYST BP >= 140 MM HG: CPT | Performed by: STUDENT IN AN ORGANIZED HEALTH CARE EDUCATION/TRAINING PROGRAM

## 2024-06-27 PROCEDURE — 83036 HEMOGLOBIN GLYCOSYLATED A1C: CPT | Performed by: STUDENT IN AN ORGANIZED HEALTH CARE EDUCATION/TRAINING PROGRAM

## 2024-06-27 RX ORDER — CARVEDILOL 6.25 MG/1
6.25 TABLET ORAL 2 TIMES DAILY
Qty: 180 TABLET | Refills: 0 | Status: SHIPPED | OUTPATIENT
Start: 2024-06-27

## 2024-06-27 SDOH — ECONOMIC STABILITY: FOOD INSECURITY: WITHIN THE PAST 12 MONTHS, YOU WORRIED THAT YOUR FOOD WOULD RUN OUT BEFORE YOU GOT MONEY TO BUY MORE.: NEVER TRUE

## 2024-06-27 SDOH — ECONOMIC STABILITY: FOOD INSECURITY: WITHIN THE PAST 12 MONTHS, THE FOOD YOU BOUGHT JUST DIDN'T LAST AND YOU DIDN'T HAVE MONEY TO GET MORE.: NEVER TRUE

## 2024-06-27 SDOH — ECONOMIC STABILITY: INCOME INSECURITY: HOW HARD IS IT FOR YOU TO PAY FOR THE VERY BASICS LIKE FOOD, HOUSING, MEDICAL CARE, AND HEATING?: NOT HARD AT ALL

## 2024-06-27 ASSESSMENT — PATIENT HEALTH QUESTIONNAIRE - PHQ9
SUM OF ALL RESPONSES TO PHQ QUESTIONS 1-9: 0
SUM OF ALL RESPONSES TO PHQ9 QUESTIONS 1 & 2: 0
1. LITTLE INTEREST OR PLEASURE IN DOING THINGS: NOT AT ALL
2. FEELING DOWN, DEPRESSED OR HOPELESS: NOT AT ALL

## 2024-06-27 ASSESSMENT — ENCOUNTER SYMPTOMS
ABDOMINAL PAIN: 0
SHORTNESS OF BREATH: 0

## 2024-06-27 NOTE — PROGRESS NOTES
\"Have you been to the ER, urgent care clinic since your last visit?  Hospitalized since your last visit?\"    NO    “Have you seen or consulted any other health care providers outside of [facility] since your last visit?”    NO    “Have you had a colorectal cancer screening such as a colonoscopy/FIT/Cologuard?    NO    No colonoscopy on file  No cologuard on file  No FIT/FOBT on file   No flexible sigmoidoscopy on file        Have you had a mammogram?”   NO    Date of last Mammogram: 2/25/2021

## 2024-06-27 NOTE — PROGRESS NOTES
HISTORY OF PRESENT ILLNESS  Kalie Vaughan is a 59 y.o. female presenting today for   Chief Complaint   Patient presents with    Hypertension    Diabetes        T2D-Taking Metaglip 5-500mg BID and jardiance 10mg. She has yet to est with endo  Last home BS:  150s  Checking BS: on occasion  Hypoglycemia: Denies  Foot/Eye exam: Has yet to have this performed     HTN- Taking Iberstartan 300mg.  Denies changes in vision hearing or headaches    Atrial Fib- F/w cardio annually. Taking Eliquis 5mg BID. Not on beta blocker since ablation a number of yrs ago Reports recent increase of palatitons in the last couple of days that has caused increased anxiety as a result. Last time she felt this was last night.     HLD- Statin intolerant          Medications reviewed and updated.    Review of Systems   Eyes:  Negative for visual disturbance.   Respiratory:  Negative for shortness of breath.    Cardiovascular:  Positive for palpitations. Negative for chest pain.   Gastrointestinal:  Negative for abdominal pain.   Neurological:  Negative for headaches.         BP (!) 150/80   Pulse 83   Temp 97.2 °F (36.2 °C) (Skin)   Resp 20   Ht 1.626 m (5' 4\")   Wt 117 kg (258 lb)   SpO2 97%   BMI 44.29 kg/m²     Physical Exam  Constitutional:       Appearance: She is obese.   HENT:      Mouth/Throat:      Comments: MASK  Eyes:      Pupils: Pupils are equal, round, and reactive to light.   Cardiovascular:      Rate and Rhythm: Normal rate and regular rhythm.   Pulmonary:      Effort: Pulmonary effort is normal.      Breath sounds: Normal breath sounds.   Musculoskeletal:      Right lower leg: No edema.      Left lower leg: No edema.   Psychiatric:         Mood and Affect: Mood normal.         ASSESSMENT and PLAN    ICD-10-CM    1. Type 2 diabetes mellitus with hyperglycemia, with long-term current use of insulin (HCC)  E11.65 AMB POC HEMOGLOBIN A1C    Z79.4 EKG 12 Lead      2. Essential (primary) hypertension  I10 carvedilol (COREG)

## 2024-08-05 RX ORDER — APIXABAN 5 MG/1
TABLET, FILM COATED ORAL
Qty: 180 TABLET | Refills: 0 | Status: SHIPPED | OUTPATIENT
Start: 2024-08-05

## 2024-08-05 NOTE — TELEPHONE ENCOUNTER
Requested Prescriptions     Pending Prescriptions Disp Refills    ELIQUIS 5 MG TABS tablet [Pharmacy Med Name: Eliquis 5 MG Oral Tablet] 180 tablet 0     Sig: TAKE 1 TABLET BY MOUTH TWICE DAILY . APPOINTMENT REQUIRED FOR FUTURE REFILLS         Forward request to PCP.

## 2024-08-08 ENCOUNTER — OFFICE VISIT (OUTPATIENT)
Facility: CLINIC | Age: 59
End: 2024-08-08

## 2024-08-08 VITALS
WEIGHT: 259 LBS | HEART RATE: 85 BPM | TEMPERATURE: 98 F | SYSTOLIC BLOOD PRESSURE: 150 MMHG | HEIGHT: 64 IN | RESPIRATION RATE: 18 BRPM | DIASTOLIC BLOOD PRESSURE: 80 MMHG | BODY MASS INDEX: 44.22 KG/M2 | OXYGEN SATURATION: 95 %

## 2024-08-08 DIAGNOSIS — Z12.31 ENCOUNTER FOR SCREENING MAMMOGRAM FOR MALIGNANT NEOPLASM OF BREAST: ICD-10-CM

## 2024-08-08 DIAGNOSIS — E66.01 CLASS 3 SEVERE OBESITY DUE TO EXCESS CALORIES WITH BODY MASS INDEX (BMI) OF 40.0 TO 44.9 IN ADULT, UNSPECIFIED WHETHER SERIOUS COMORBIDITY PRESENT (HCC): ICD-10-CM

## 2024-08-08 DIAGNOSIS — Z79.4 TYPE 2 DIABETES MELLITUS WITH RETINOPATHY AND MACULAR EDEMA, WITH LONG-TERM CURRENT USE OF INSULIN, UNSPECIFIED LATERALITY, UNSPECIFIED RETINOPATHY SEVERITY (HCC): ICD-10-CM

## 2024-08-08 DIAGNOSIS — E11.311 TYPE 2 DIABETES MELLITUS WITH RETINOPATHY AND MACULAR EDEMA, WITH LONG-TERM CURRENT USE OF INSULIN, UNSPECIFIED LATERALITY, UNSPECIFIED RETINOPATHY SEVERITY (HCC): ICD-10-CM

## 2024-08-08 DIAGNOSIS — Z12.11 COLON CANCER SCREENING: ICD-10-CM

## 2024-08-08 DIAGNOSIS — I10 ESSENTIAL (PRIMARY) HYPERTENSION: Primary | ICD-10-CM

## 2024-08-08 DIAGNOSIS — Z13.89 OBESITY SCREEN: ICD-10-CM

## 2024-08-08 RX ORDER — IRBESARTAN AND HYDROCHLOROTHIAZIDE 300; 12.5 MG/1; MG/1
1 TABLET, FILM COATED ORAL DAILY
Qty: 90 TABLET | Refills: 1 | Status: SHIPPED | OUTPATIENT
Start: 2024-08-08

## 2024-08-08 ASSESSMENT — PATIENT HEALTH QUESTIONNAIRE - PHQ9
SUM OF ALL RESPONSES TO PHQ QUESTIONS 1-9: 0
SUM OF ALL RESPONSES TO PHQ QUESTIONS 1-9: 0
SUM OF ALL RESPONSES TO PHQ9 QUESTIONS 1 & 2: 0
SUM OF ALL RESPONSES TO PHQ QUESTIONS 1-9: 0
2. FEELING DOWN, DEPRESSED OR HOPELESS: NOT AT ALL
1. LITTLE INTEREST OR PLEASURE IN DOING THINGS: NOT AT ALL
SUM OF ALL RESPONSES TO PHQ QUESTIONS 1-9: 0

## 2024-08-08 ASSESSMENT — ENCOUNTER SYMPTOMS
SHORTNESS OF BREATH: 0
ABDOMINAL PAIN: 0

## 2024-08-08 NOTE — PROGRESS NOTES
\"Have you been to the ER, urgent care clinic since your last visit?  Hospitalized since your last visit?\"    NO    “Have you seen or consulted any other health care providers outside of [facility] since your last visit?”    YES - When: approximately 2  weeks ago.  Where and Why: [facility].    “Have you had a colorectal cancer screening such as a colonoscopy/FIT/Cologuard?    NO    No colonoscopy on file  No cologuard on file  No FIT/FOBT on file   No flexible sigmoidoscopy on file        Have you had a mammogram?”   NO    Date of last Mammogram: 2/25/2021

## 2024-08-08 NOTE — PROGRESS NOTES
HISTORY OF PRESENT ILLNESS  Kalie Vaughan is a 59 y.o. female presenting today for   Chief Complaint   Patient presents with    Hypertension        HTN- Taking Iberstartan 300mg. She has been on this dose for a long time. Does not check her blood pressure at home. Of note she was recently dx with retinopathy and macular edema and is est with [facility] f/w Dr Dewitt.       Medications reviewed and updated.    Review of Systems   Eyes:  Negative for visual disturbance.   Respiratory:  Negative for shortness of breath.    Cardiovascular:  Negative for chest pain.   Gastrointestinal:  Negative for abdominal pain.   Neurological:  Negative for headaches.         BP (!) 151/78   Pulse 85   Temp 98 °F (36.7 °C) (Skin)   Resp 18   Ht 1.626 m (5' 4\")   Wt 117.5 kg (259 lb)   SpO2 95%   BMI 44.46 kg/m²     Physical Exam  Constitutional:       Appearance: She is obese.   HENT:      Mouth/Throat:      Comments: MASK  Eyes:      Pupils: Pupils are equal, round, and reactive to light.   Cardiovascular:      Rate and Rhythm: Normal rate and regular rhythm.   Pulmonary:      Effort: Pulmonary effort is normal.      Breath sounds: Normal breath sounds.   Musculoskeletal:      Right lower leg: Edema (trace) present.      Left lower leg: No edema.   Psychiatric:         Mood and Affect: Mood normal.         ASSESSMENT and PLAN    ICD-10-CM    1. Essential (primary) hypertension  I10 irbesartan-hydroCHLOROthiazide (AVALIDE) 300-12.5 MG per tablet      2. 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 Class 3 severe obesity due to excess calories with body mass index (BMI) of 40.0 to 44.9 in adult, unspecified whether serious comorbidity present (HCC)  E66.01     Z68.41       4. Colon cancer screening  Z12.11 Cologuard (Fecal DNA Colorectal Cancer Screening)      5.

## 2024-08-22 DIAGNOSIS — E11.65 TYPE 2 DIABETES MELLITUS WITH HYPERGLYCEMIA, WITH LONG-TERM CURRENT USE OF INSULIN (HCC): ICD-10-CM

## 2024-08-22 DIAGNOSIS — Z79.4 TYPE 2 DIABETES MELLITUS WITH HYPERGLYCEMIA, WITH LONG-TERM CURRENT USE OF INSULIN (HCC): ICD-10-CM

## 2024-08-23 RX ORDER — GLIPIZIDE AND METFORMIN HCL 5; 500 MG/1; MG/1
TABLET, FILM COATED ORAL
Qty: 180 TABLET | Refills: 0 | Status: SHIPPED | OUTPATIENT
Start: 2024-08-23

## 2024-08-27 LAB — NONINV COLON CA DNA+OCC BLD SCRN STL QL: NEGATIVE

## 2024-08-30 ENCOUNTER — HOSPITAL ENCOUNTER (OUTPATIENT)
Dept: WOMENS IMAGING | Facility: HOSPITAL | Age: 59
Discharge: HOME | End: 2024-08-30
Payer: COMMERCIAL

## 2024-08-30 DIAGNOSIS — Z12.31 ENCOUNTER FOR SCREENING MAMMOGRAM FOR MALIGNANT NEOPLASM OF BREAST: ICD-10-CM

## 2024-08-30 PROCEDURE — 77063 BREAST TOMOSYNTHESIS BI: CPT

## 2024-09-10 DIAGNOSIS — R92.323 MAMMOGRAPHIC FIBROGLANDULAR DENSITY, BILATERAL BREASTS: ICD-10-CM

## 2024-09-10 DIAGNOSIS — Z12.31 ENCOUNTER FOR SCREENING MAMMOGRAM FOR MALIGNANT NEOPLASM OF BREAST: Primary | ICD-10-CM

## 2024-09-13 DIAGNOSIS — G43.819 OTHER MIGRAINE WITHOUT STATUS MIGRAINOSUS, INTRACTABLE: ICD-10-CM

## 2024-09-13 DIAGNOSIS — Z79.4 TYPE 2 DIABETES MELLITUS WITH HYPERGLYCEMIA, WITH LONG-TERM CURRENT USE OF INSULIN (HCC): ICD-10-CM

## 2024-09-13 DIAGNOSIS — I10 ESSENTIAL (PRIMARY) HYPERTENSION: ICD-10-CM

## 2024-09-13 DIAGNOSIS — E11.65 TYPE 2 DIABETES MELLITUS WITH HYPERGLYCEMIA, WITH LONG-TERM CURRENT USE OF INSULIN (HCC): ICD-10-CM

## 2024-09-13 DIAGNOSIS — I48.91 ATRIAL FIBRILLATION, UNSPECIFIED TYPE (HCC): ICD-10-CM

## 2024-09-16 RX ORDER — IRBESARTAN 300 MG/1
300 TABLET ORAL DAILY
Qty: 90 TABLET | Refills: 0 | Status: SHIPPED | OUTPATIENT
Start: 2024-09-16

## 2024-09-16 RX ORDER — TOPIRAMATE 100 MG/1
100 TABLET, FILM COATED ORAL DAILY
Qty: 90 TABLET | Refills: 0 | Status: SHIPPED | OUTPATIENT
Start: 2024-09-16

## 2024-09-16 RX ORDER — CARVEDILOL 6.25 MG/1
6.25 TABLET ORAL 2 TIMES DAILY
Qty: 180 TABLET | Refills: 0 | Status: SHIPPED | OUTPATIENT
Start: 2024-09-16

## 2024-11-01 RX ORDER — APIXABAN 5 MG/1
TABLET, FILM COATED ORAL
Qty: 180 TABLET | Refills: 0 | OUTPATIENT
Start: 2024-11-01

## 2024-11-07 ENCOUNTER — HOSPITAL ENCOUNTER (OUTPATIENT)
Facility: HOSPITAL | Age: 59
Setting detail: SPECIMEN
Discharge: HOME | End: 2024-11-10
Payer: COMMERCIAL

## 2024-11-07 ENCOUNTER — OFFICE VISIT (OUTPATIENT)
Facility: CLINIC | Age: 59
End: 2024-11-07

## 2024-11-07 VITALS
WEIGHT: 263 LBS | OXYGEN SATURATION: 94 % | DIASTOLIC BLOOD PRESSURE: 80 MMHG | RESPIRATION RATE: 20 BRPM | HEART RATE: 83 BPM | TEMPERATURE: 96.9 F | SYSTOLIC BLOOD PRESSURE: 140 MMHG | HEIGHT: 64 IN | BODY MASS INDEX: 44.9 KG/M2

## 2024-11-07 DIAGNOSIS — Z00.00 ENCOUNTER FOR GENERAL ADULT MEDICAL EXAMINATION WITHOUT ABNORMAL FINDINGS: Primary | ICD-10-CM

## 2024-11-07 DIAGNOSIS — E11.65 TYPE 2 DIABETES MELLITUS WITH HYPERGLYCEMIA, WITH LONG-TERM CURRENT USE OF INSULIN (HCC): ICD-10-CM

## 2024-11-07 DIAGNOSIS — I48.91 ATRIAL FIBRILLATION, UNSPECIFIED TYPE (HCC): ICD-10-CM

## 2024-11-07 DIAGNOSIS — E66.813 CLASS 3 SEVERE OBESITY DUE TO EXCESS CALORIES WITH BODY MASS INDEX (BMI) OF 40.0 TO 44.9 IN ADULT, UNSPECIFIED WHETHER SERIOUS COMORBIDITY PRESENT: ICD-10-CM

## 2024-11-07 DIAGNOSIS — Z79.4 TYPE 2 DIABETES MELLITUS WITH HYPERGLYCEMIA, WITH LONG-TERM CURRENT USE OF INSULIN (HCC): ICD-10-CM

## 2024-11-07 DIAGNOSIS — I10 ESSENTIAL (PRIMARY) HYPERTENSION: ICD-10-CM

## 2024-11-07 DIAGNOSIS — Z13.31 DEPRESSION SCREENING NEGATIVE: ICD-10-CM

## 2024-11-07 DIAGNOSIS — Z79.4 TYPE 2 DIABETES MELLITUS WITH RETINOPATHY AND MACULAR EDEMA, WITH LONG-TERM CURRENT USE OF INSULIN, UNSPECIFIED LATERALITY, UNSPECIFIED RETINOPATHY SEVERITY (HCC): ICD-10-CM

## 2024-11-07 DIAGNOSIS — E11.311 TYPE 2 DIABETES MELLITUS WITH RETINOPATHY AND MACULAR EDEMA, WITH LONG-TERM CURRENT USE OF INSULIN, UNSPECIFIED LATERALITY, UNSPECIFIED RETINOPATHY SEVERITY (HCC): ICD-10-CM

## 2024-11-07 DIAGNOSIS — E66.01 CLASS 3 SEVERE OBESITY DUE TO EXCESS CALORIES WITH BODY MASS INDEX (BMI) OF 40.0 TO 44.9 IN ADULT, UNSPECIFIED WHETHER SERIOUS COMORBIDITY PRESENT: ICD-10-CM

## 2024-11-07 DIAGNOSIS — E55.9 VITAMIN D DEFICIENCY, UNSPECIFIED: ICD-10-CM

## 2024-11-07 DIAGNOSIS — Z13.9 ENCOUNTER FOR SCREENING INVOLVING SOCIAL DETERMINANTS OF HEALTH (SDOH): ICD-10-CM

## 2024-11-07 LAB
25(OH)D3 SERPL-MCNC: 21.3 NG/ML (ref 30–100)
ALBUMIN SERPL-MCNC: 3.6 G/DL (ref 3.4–5)
ALBUMIN/GLOB SERPL: 1.2 (ref 0.8–1.7)
ALP SERPL-CCNC: 156 U/L (ref 45–117)
ALT SERPL-CCNC: 29 U/L (ref 13–56)
ANION GAP SERPL CALC-SCNC: 2 MMOL/L (ref 3–18)
AST SERPL-CCNC: 15 U/L (ref 10–38)
BILIRUB SERPL-MCNC: 0.4 MG/DL (ref 0.2–1)
BUN SERPL-MCNC: 19 MG/DL (ref 7–18)
BUN/CREAT SERPL: 37 (ref 12–20)
CALCIUM SERPL-MCNC: 9.5 MG/DL (ref 8.5–10.1)
CHLORIDE SERPL-SCNC: 115 MMOL/L (ref 100–111)
CHOLEST SERPL-MCNC: 206 MG/DL
CO2 SERPL-SCNC: 27 MMOL/L (ref 21–32)
CREAT SERPL-MCNC: 0.52 MG/DL (ref 0.6–1.3)
EST. AVERAGE GLUCOSE BLD GHB EST-MCNC: 148 MG/DL
GLOBULIN SER CALC-MCNC: 2.9 G/DL (ref 2–4)
GLUCOSE SERPL-MCNC: 137 MG/DL (ref 74–99)
HBA1C MFR BLD: 6.8 % (ref 4.2–5.6)
HDLC SERPL-MCNC: 65 MG/DL (ref 40–60)
HDLC SERPL: 3.2 (ref 0–5)
LDLC SERPL CALC-MCNC: 130.2 MG/DL (ref 0–100)
LIPID PANEL: ABNORMAL
POTASSIUM SERPL-SCNC: 3.9 MMOL/L (ref 3.5–5.5)
PROT SERPL-MCNC: 6.5 G/DL (ref 6.4–8.2)
SODIUM SERPL-SCNC: 144 MMOL/L (ref 136–145)
TRIGL SERPL-MCNC: 54 MG/DL
VLDLC SERPL CALC-MCNC: 10.8 MG/DL

## 2024-11-07 PROCEDURE — 80061 LIPID PANEL: CPT

## 2024-11-07 PROCEDURE — 80053 COMPREHEN METABOLIC PANEL: CPT

## 2024-11-07 PROCEDURE — 36415 COLL VENOUS BLD VENIPUNCTURE: CPT

## 2024-11-07 PROCEDURE — 82306 VITAMIN D 25 HYDROXY: CPT

## 2024-11-07 PROCEDURE — 83036 HEMOGLOBIN GLYCOSYLATED A1C: CPT

## 2024-11-07 RX ORDER — GLIPIZIDE 10 MG/1
10 TABLET, FILM COATED, EXTENDED RELEASE ORAL DAILY
Qty: 90 TABLET | Refills: 2 | Status: SHIPPED | OUTPATIENT
Start: 2024-11-07

## 2024-11-07 RX ORDER — ERGOCALCIFEROL 1.25 MG/1
50000 CAPSULE ORAL
Qty: 8 CAPSULE | Refills: 1 | Status: SHIPPED | OUTPATIENT
Start: 2024-11-07

## 2024-11-07 SDOH — HEALTH STABILITY: MENTAL HEALTH
STRESS IS WHEN SOMEONE FEELS TENSE, NERVOUS, ANXIOUS, OR CAN'T SLEEP AT NIGHT BECAUSE THEIR MIND IS TROUBLED. HOW STRESSED ARE YOU?: TO SOME EXTENT

## 2024-11-07 SDOH — ECONOMIC STABILITY: INCOME INSECURITY: IN THE LAST 12 MONTHS, WAS THERE A TIME WHEN YOU WERE NOT ABLE TO PAY THE MORTGAGE OR RENT ON TIME?: NO

## 2024-11-07 SDOH — SOCIAL STABILITY: SOCIAL INSECURITY: WITHIN THE LAST YEAR, HAVE YOU BEEN HUMILIATED OR EMOTIONALLY ABUSED IN OTHER WAYS BY YOUR PARTNER OR EX-PARTNER?: NO

## 2024-11-07 SDOH — SOCIAL STABILITY: SOCIAL INSECURITY
WITHIN THE LAST YEAR, HAVE TO BEEN RAPED OR FORCED TO HAVE ANY KIND OF SEXUAL ACTIVITY BY YOUR PARTNER OR EX-PARTNER?: NO

## 2024-11-07 SDOH — HEALTH STABILITY: MENTAL HEALTH: HOW MANY STANDARD DRINKS CONTAINING ALCOHOL DO YOU HAVE ON A TYPICAL DAY?: PATIENT DOES NOT DRINK

## 2024-11-07 SDOH — SOCIAL STABILITY: SOCIAL NETWORK
DO YOU BELONG TO ANY CLUBS OR ORGANIZATIONS SUCH AS CHURCH GROUPS UNIONS, FRATERNAL OR ATHLETIC GROUPS, OR SCHOOL GROUPS?: NO

## 2024-11-07 SDOH — SOCIAL STABILITY: SOCIAL NETWORK: ARE YOU MARRIED, WIDOWED, DIVORCED, SEPARATED, NEVER MARRIED, OR LIVING WITH A PARTNER?: MARRIED

## 2024-11-07 SDOH — SOCIAL STABILITY: SOCIAL INSECURITY: WITHIN THE LAST YEAR, HAVE YOU BEEN AFRAID OF YOUR PARTNER OR EX-PARTNER?: NO

## 2024-11-07 SDOH — HEALTH STABILITY: PHYSICAL HEALTH: ON AVERAGE, HOW MANY MINUTES DO YOU ENGAGE IN EXERCISE AT THIS LEVEL?: 30 MIN

## 2024-11-07 SDOH — ECONOMIC STABILITY: TRANSPORTATION INSECURITY
IN THE PAST 12 MONTHS, HAS THE LACK OF TRANSPORTATION KEPT YOU FROM MEDICAL APPOINTMENTS OR FROM GETTING MEDICATIONS?: NO

## 2024-11-07 SDOH — ECONOMIC STABILITY: TRANSPORTATION INSECURITY
IN THE PAST 12 MONTHS, HAS LACK OF TRANSPORTATION KEPT YOU FROM MEETINGS, WORK, OR FROM GETTING THINGS NEEDED FOR DAILY LIVING?: NO

## 2024-11-07 SDOH — SOCIAL STABILITY: SOCIAL NETWORK: HOW OFTEN DO YOU ATTENT MEETINGS OF THE CLUB OR ORGANIZATION YOU BELONG TO?: NEVER

## 2024-11-07 SDOH — ECONOMIC STABILITY: INCOME INSECURITY: HOW HARD IS IT FOR YOU TO PAY FOR THE VERY BASICS LIKE FOOD, HOUSING, MEDICAL CARE, AND HEATING?: NOT HARD AT ALL

## 2024-11-07 SDOH — HEALTH STABILITY: PHYSICAL HEALTH: ON AVERAGE, HOW MANY DAYS PER WEEK DO YOU ENGAGE IN MODERATE TO STRENUOUS EXERCISE (LIKE A BRISK WALK)?: 3 DAYS

## 2024-11-07 SDOH — SOCIAL STABILITY: SOCIAL INSECURITY
WITHIN THE LAST YEAR, HAVE YOU BEEN KICKED, HIT, SLAPPED, OR OTHERWISE PHYSICALLY HURT BY YOUR PARTNER OR EX-PARTNER?: NO

## 2024-11-07 SDOH — HEALTH STABILITY: MENTAL HEALTH: HOW OFTEN DO YOU HAVE A DRINK CONTAINING ALCOHOL?: NEVER

## 2024-11-07 SDOH — SOCIAL STABILITY: SOCIAL NETWORK: HOW OFTEN DO YOU ATTEND CHURCH OR RELIGIOUS SERVICES?: NEVER

## 2024-11-07 SDOH — SOCIAL STABILITY: SOCIAL NETWORK
IN A TYPICAL WEEK, HOW MANY TIMES DO YOU TALK ON THE PHONE WITH FAMILY, FRIENDS, OR NEIGHBORS?: MORE THAN THREE TIMES A WEEK

## 2024-11-07 SDOH — SOCIAL STABILITY: SOCIAL NETWORK: HOW OFTEN DO YOU GET TOGETHER WITH FRIENDS OR RELATIVES?: MORE THAN THREE TIMES A WEEK

## 2024-11-07 ASSESSMENT — PATIENT HEALTH QUESTIONNAIRE - PHQ9
SUM OF ALL RESPONSES TO PHQ9 QUESTIONS 1 & 2: 0
SUM OF ALL RESPONSES TO PHQ QUESTIONS 1-9: 0
SUM OF ALL RESPONSES TO PHQ QUESTIONS 1-9: 0
2. FEELING DOWN, DEPRESSED OR HOPELESS: NOT AT ALL
SUM OF ALL RESPONSES TO PHQ QUESTIONS 1-9: 0
SUM OF ALL RESPONSES TO PHQ QUESTIONS 1-9: 0
1. LITTLE INTEREST OR PLEASURE IN DOING THINGS: NOT AT ALL

## 2024-11-07 ASSESSMENT — ENCOUNTER SYMPTOMS
ABDOMINAL PAIN: 0
SHORTNESS OF BREATH: 0

## 2024-11-07 NOTE — PROGRESS NOTES
\"Have you been to the ER, urgent care clinic since your last visit?  Hospitalized since your last visit?\"    NO    “Have you seen or consulted any other health care providers outside of [facility] since your last visit?”    NO         
      Recommendations for Preventive Services Due: see orders and patient instructions/AVS.    Return in about 4 months (around 3/7/2025) for T2D,HTN, HLD.

## 2024-11-07 NOTE — RESULT ENCOUNTER NOTE
Your vitamin D is on the low side, I would like you to restart your once a week vitamin D supplement.  I have sent this back to the pharmacy today.  Your A1c has improved tremendously at 6.8.  Your cholesterol has improved as well and the rest of your labs are stable.  Keep up the great work!

## 2024-12-17 ENCOUNTER — OFFICE VISIT (OUTPATIENT)
Age: 59
End: 2024-12-17
Payer: COMMERCIAL

## 2024-12-17 VITALS
WEIGHT: 261 LBS | DIASTOLIC BLOOD PRESSURE: 75 MMHG | BODY MASS INDEX: 44.56 KG/M2 | SYSTOLIC BLOOD PRESSURE: 133 MMHG | OXYGEN SATURATION: 99 % | HEIGHT: 64 IN | HEART RATE: 78 BPM

## 2024-12-17 DIAGNOSIS — E78.00 PURE HYPERCHOLESTEROLEMIA: ICD-10-CM

## 2024-12-17 DIAGNOSIS — I10 ESSENTIAL HYPERTENSION WITH GOAL BLOOD PRESSURE LESS THAN 140/90: Primary | ICD-10-CM

## 2024-12-17 PROCEDURE — 3075F SYST BP GE 130 - 139MM HG: CPT | Performed by: INTERNAL MEDICINE

## 2024-12-17 PROCEDURE — 3078F DIAST BP <80 MM HG: CPT | Performed by: INTERNAL MEDICINE

## 2024-12-17 PROCEDURE — 99214 OFFICE O/P EST MOD 30 MIN: CPT | Performed by: INTERNAL MEDICINE

## 2024-12-17 RX ORDER — EZETIMIBE 10 MG/1
10 TABLET ORAL DAILY
Qty: 90 TABLET | Refills: 3 | Status: SHIPPED | OUTPATIENT
Start: 2024-12-17

## 2024-12-17 ASSESSMENT — PATIENT HEALTH QUESTIONNAIRE - PHQ9
2. FEELING DOWN, DEPRESSED OR HOPELESS: NOT AT ALL
SUM OF ALL RESPONSES TO PHQ QUESTIONS 1-9: 0
SUM OF ALL RESPONSES TO PHQ QUESTIONS 1-9: 0
1. LITTLE INTEREST OR PLEASURE IN DOING THINGS: NOT AT ALL
SUM OF ALL RESPONSES TO PHQ QUESTIONS 1-9: 0
SUM OF ALL RESPONSES TO PHQ QUESTIONS 1-9: 0
SUM OF ALL RESPONSES TO PHQ9 QUESTIONS 1 & 2: 0

## 2024-12-17 NOTE — PROGRESS NOTES
[facility]    Kalie Vaughan is 59 y.o. female with a history of atrial fibrillation status post ablation, CVA, diabetes, hypertension, hyperlipidemia, Sjogren's syndrome and other medical problems    Patient denies any prior history of MI or CHF.  Patient  was diagnosed with atrial fibrillation approximately in 2015.  Patient had EP study and A. fib ablation successfully in 2016 in [place].  Echo in 10/2023: Normal EF.      Patient is here today for follow-up appointment for atrial fibrillation  Denies any hospital admission or ER visit.  Denies any new medication  Denies any resting or exertional chest pain or chest pressure to suggest angina or any dyspnea to suggest heart failure.   No presyncope or syncope  Unfortunately after starting metformin, patient gained almost 30 pounds.  Taking all medications regularly.   Denies any nausea, vomiting, abdominal pain, fever, chills, sputum production. No hematuria or other bleeding complaints       Past Medical History:   Diagnosis Date    Atrial fibrillation (HCC)     S/P Ablatioin in 2016 at [facility]    Claustrophobia 04/27/2022    Patient requesting Full Sedation for MRI    CVA (cerebral vascular accident) (HCC)     Diabetes mellitus, type 2 (HCC)     Family history of early CAD     Father at 44, Brother at 39    GERD (gastroesophageal reflux disease)     Goiter     H/O lupus anticoagulant disorder     HLD (hyperlipidemia)     HTN (hypertension)     Migraines     Reportedly without Aura    BOB (nonalcoholic steatohepatitis)     Sjogren's syndrome (HCC)        Review of Systems:  Cardiac symptoms as noted above in HPI. All others negative.  Denies fatigue, malaise, skin rash, joint pain, blurring vision, photophobia, neck pain, hemoptysis, chronic cough, nausea, vomiting, hematuria, burning micturition, BRBPR, chronic headaches.    Current Outpatient Medications   Medication

## 2024-12-17 NOTE — PROGRESS NOTES
Identified pt with two pt identifiers(name and ). Reviewed record in preparation for visit and have obtained necessary documentation.    Kalie Vaughan presents today for   Chief Complaint   Patient presents with    Follow-up     1 Year Follow Up       Pt c/o SOB sometimes but not too often.     Kalie Vaughan preferred language for health care discussion is english/other.    Personal Protective Equipment:   Personal Protective Equipment was used including: mask-surgical and hands-gloves. Patient was placed on no precaution(s). Patient was not masked.    Precautions:   Patient currently on None  Patient currently roomed with door closed.    Is someone accompanying this pt? No     Is the patient using any DME equipment during OV? No     Depression Screenin/17/2024    10:46 AM 2024     7:43 AM 2024     7:28 AM 2024     7:24 AM 2024    10:20 AM 2024    10:17 AM 2023     7:27 AM   PHQ-9 Questionaire   Little interest or pleasure in doing things 0 0 0 0 0 0 0   Feeling down, depressed, or hopeless 0 0 0 0 0 0 0   PHQ-9 Total Score 0 0 0 0 0 0 0        Learning Assessment:  Who is the primary learner? Patient    What is the preferred language for health care of the primary learner? ENGLISH    How does the primary learner prefer to learn new concepts? READING    Answered By pt    Relationship to Learner SELF        Abuse Screenin/17/2024    10:00 AM 2023     9:00 AM 3/14/2023     9:00 AM   AMB Abuse Screening   Do you ever feel afraid of your partner? N N N   Are you in a relationship with someone who physically or mentally threatens you? N N N   Is it safe for you to go home? Y Y Y          Fall Risk      2024    10:47 AM 2023     9:58 AM 3/14/2023     9:00 AM   Fall Risk   Do you feel unsteady or are you worried about falling?  no no no   2 or more falls in past year? no no no   Fall with injury in past year? no no no         Pt currently

## 2024-12-17 NOTE — PATIENT INSTRUCTIONS
New Medication/Medication Changes/Medication Refill  Zetia 10 mg tab daily     **please allow 24-48 hrs for medication to be escribed to pharmacy** If you need any refills on medications please contact your pharmacy so that the request can be escribed to the provider for review seven to 10 days prior to being out of medication.

## 2024-12-19 DIAGNOSIS — E11.65 TYPE 2 DIABETES MELLITUS WITH HYPERGLYCEMIA, WITH LONG-TERM CURRENT USE OF INSULIN (HCC): ICD-10-CM

## 2024-12-19 DIAGNOSIS — G43.819 OTHER MIGRAINE WITHOUT STATUS MIGRAINOSUS, INTRACTABLE: ICD-10-CM

## 2024-12-19 DIAGNOSIS — Z79.4 TYPE 2 DIABETES MELLITUS WITH HYPERGLYCEMIA, WITH LONG-TERM CURRENT USE OF INSULIN (HCC): ICD-10-CM

## 2024-12-19 DIAGNOSIS — I10 ESSENTIAL (PRIMARY) HYPERTENSION: ICD-10-CM

## 2024-12-19 DIAGNOSIS — I48.91 ATRIAL FIBRILLATION, UNSPECIFIED TYPE (HCC): ICD-10-CM

## 2024-12-20 RX ORDER — EMPAGLIFLOZIN 10 MG/1
10 TABLET, FILM COATED ORAL DAILY
Qty: 90 TABLET | Refills: 0 | Status: SHIPPED | OUTPATIENT
Start: 2024-12-20

## 2024-12-20 RX ORDER — IRBESARTAN 300 MG/1
300 TABLET ORAL DAILY
Qty: 90 TABLET | Refills: 0 | Status: SHIPPED | OUTPATIENT
Start: 2024-12-20

## 2024-12-20 RX ORDER — CARVEDILOL 6.25 MG/1
6.25 TABLET ORAL 2 TIMES DAILY
Qty: 180 TABLET | Refills: 0 | Status: SHIPPED | OUTPATIENT
Start: 2024-12-20

## 2024-12-20 RX ORDER — TOPIRAMATE 100 MG/1
100 TABLET, FILM COATED ORAL DAILY
Qty: 90 TABLET | Refills: 0 | Status: SHIPPED | OUTPATIENT
Start: 2024-12-20

## 2024-12-20 NOTE — TELEPHONE ENCOUNTER
Last Appointment:  11/7/2024  Future Appointments   Date Time Provider Department Center   3/7/2025  7:45 AM [facility]   9/5/2025  7:30 AM VALDEMAR QUIÑONEZ BX RM 1 [facility]   9/23/2025  1:30 PM Tiana Tucker PA-C [facility] BS AMB

## 2025-02-25 DIAGNOSIS — E55.9 VITAMIN D DEFICIENCY, UNSPECIFIED: ICD-10-CM

## 2025-02-26 RX ORDER — ERGOCALCIFEROL 1.25 MG/1
50000 CAPSULE, LIQUID FILLED ORAL WEEKLY
Qty: 12 CAPSULE | Refills: 1 | Status: SHIPPED | OUTPATIENT
Start: 2025-02-26

## 2025-02-26 NOTE — TELEPHONE ENCOUNTER
Last Appointment:  11/7/2024  Future Appointments   Date Time Provider Department Center   3/7/2025  7:45 AM [facility]   9/5/2025  7:30 AM VALDEMAR QUIÑONEZ BX RM 1 [facility]   9/23/2025  1:30 PM Tiana Tucker PA-C [facility] BS AMB

## 2025-03-06 SDOH — ECONOMIC STABILITY: INCOME INSECURITY: IN THE LAST 12 MONTHS, WAS THERE A TIME WHEN YOU WERE NOT ABLE TO PAY THE MORTGAGE OR RENT ON TIME?: NO

## 2025-03-06 SDOH — ECONOMIC STABILITY: FOOD INSECURITY: WITHIN THE PAST 12 MONTHS, YOU WORRIED THAT YOUR FOOD WOULD RUN OUT BEFORE YOU GOT MONEY TO BUY MORE.: PATIENT DECLINED

## 2025-03-06 SDOH — ECONOMIC STABILITY: FOOD INSECURITY: WITHIN THE PAST 12 MONTHS, THE FOOD YOU BOUGHT JUST DIDN'T LAST AND YOU DIDN'T HAVE MONEY TO GET MORE.: PATIENT DECLINED

## 2025-03-07 ENCOUNTER — HOSPITAL ENCOUNTER (OUTPATIENT)
Facility: HOSPITAL | Age: 60
Setting detail: SPECIMEN
Discharge: HOME | End: 2025-03-10
Payer: COMMERCIAL

## 2025-03-07 ENCOUNTER — OFFICE VISIT (OUTPATIENT)
Facility: CLINIC | Age: 60
End: 2025-03-07
Payer: COMMERCIAL

## 2025-03-07 VITALS
HEIGHT: 64 IN | BODY MASS INDEX: 43.36 KG/M2 | SYSTOLIC BLOOD PRESSURE: 131 MMHG | RESPIRATION RATE: 16 BRPM | OXYGEN SATURATION: 98 % | WEIGHT: 254 LBS | HEART RATE: 81 BPM | TEMPERATURE: 97.2 F | DIASTOLIC BLOOD PRESSURE: 85 MMHG

## 2025-03-07 DIAGNOSIS — E66.01 CLASS 3 SEVERE OBESITY DUE TO EXCESS CALORIES WITH BODY MASS INDEX (BMI) OF 40.0 TO 44.9 IN ADULT, UNSPECIFIED WHETHER SERIOUS COMORBIDITY PRESENT: ICD-10-CM

## 2025-03-07 DIAGNOSIS — E11.311 TYPE 2 DIABETES MELLITUS WITH RETINOPATHY AND MACULAR EDEMA, WITH LONG-TERM CURRENT USE OF INSULIN, UNSPECIFIED LATERALITY, UNSPECIFIED RETINOPATHY SEVERITY (HCC): Primary | ICD-10-CM

## 2025-03-07 DIAGNOSIS — E66.813 CLASS 3 SEVERE OBESITY DUE TO EXCESS CALORIES WITH BODY MASS INDEX (BMI) OF 40.0 TO 44.9 IN ADULT, UNSPECIFIED WHETHER SERIOUS COMORBIDITY PRESENT: ICD-10-CM

## 2025-03-07 DIAGNOSIS — E11.311 TYPE 2 DIABETES MELLITUS WITH RETINOPATHY AND MACULAR EDEMA, WITH LONG-TERM CURRENT USE OF INSULIN, UNSPECIFIED LATERALITY, UNSPECIFIED RETINOPATHY SEVERITY (HCC): ICD-10-CM

## 2025-03-07 DIAGNOSIS — I48.91 ATRIAL FIBRILLATION, UNSPECIFIED TYPE (HCC): ICD-10-CM

## 2025-03-07 DIAGNOSIS — I10 ESSENTIAL (PRIMARY) HYPERTENSION: ICD-10-CM

## 2025-03-07 DIAGNOSIS — Z79.4 TYPE 2 DIABETES MELLITUS WITH RETINOPATHY AND MACULAR EDEMA, WITH LONG-TERM CURRENT USE OF INSULIN, UNSPECIFIED LATERALITY, UNSPECIFIED RETINOPATHY SEVERITY (HCC): Primary | ICD-10-CM

## 2025-03-07 DIAGNOSIS — Z79.4 TYPE 2 DIABETES MELLITUS WITH RETINOPATHY AND MACULAR EDEMA, WITH LONG-TERM CURRENT USE OF INSULIN, UNSPECIFIED LATERALITY, UNSPECIFIED RETINOPATHY SEVERITY (HCC): ICD-10-CM

## 2025-03-07 DIAGNOSIS — E11.65 TYPE 2 DIABETES MELLITUS WITH HYPERGLYCEMIA, WITHOUT LONG-TERM CURRENT USE OF INSULIN (HCC): ICD-10-CM

## 2025-03-07 LAB
CREAT UR-MCNC: 40 MG/DL (ref 30–125)
HBA1C MFR BLD: 6.9 %
MICROALBUMIN UR-MCNC: 0.67 MG/DL (ref 0–3)
MICROALBUMIN/CREAT UR-RTO: 17 MG/G (ref 0–30)

## 2025-03-07 PROCEDURE — 83036 HEMOGLOBIN GLYCOSYLATED A1C: CPT | Performed by: STUDENT IN AN ORGANIZED HEALTH CARE EDUCATION/TRAINING PROGRAM

## 2025-03-07 PROCEDURE — 3079F DIAST BP 80-89 MM HG: CPT | Performed by: STUDENT IN AN ORGANIZED HEALTH CARE EDUCATION/TRAINING PROGRAM

## 2025-03-07 PROCEDURE — 99214 OFFICE O/P EST MOD 30 MIN: CPT | Performed by: STUDENT IN AN ORGANIZED HEALTH CARE EDUCATION/TRAINING PROGRAM

## 2025-03-07 PROCEDURE — 3075F SYST BP GE 130 - 139MM HG: CPT | Performed by: STUDENT IN AN ORGANIZED HEALTH CARE EDUCATION/TRAINING PROGRAM

## 2025-03-07 PROCEDURE — 82043 UR ALBUMIN QUANTITATIVE: CPT

## 2025-03-07 PROCEDURE — 82570 ASSAY OF URINE CREATININE: CPT

## 2025-03-07 PROCEDURE — 3044F HG A1C LEVEL LT 7.0%: CPT | Performed by: STUDENT IN AN ORGANIZED HEALTH CARE EDUCATION/TRAINING PROGRAM

## 2025-03-07 ASSESSMENT — ENCOUNTER SYMPTOMS
SHORTNESS OF BREATH: 0
ABDOMINAL PAIN: 0

## 2025-03-07 ASSESSMENT — PATIENT HEALTH QUESTIONNAIRE - PHQ9
SUM OF ALL RESPONSES TO PHQ QUESTIONS 1-9: 0
2. FEELING DOWN, DEPRESSED OR HOPELESS: NOT AT ALL
SUM OF ALL RESPONSES TO PHQ QUESTIONS 1-9: 0
1. LITTLE INTEREST OR PLEASURE IN DOING THINGS: NOT AT ALL
SUM OF ALL RESPONSES TO PHQ QUESTIONS 1-9: 0
SUM OF ALL RESPONSES TO PHQ QUESTIONS 1-9: 0

## 2025-03-07 NOTE — PROGRESS NOTES
HISTORY OF PRESENT ILLNESS  Kalie Vaughan is a 60 y.o. female presenting today for   Chief Complaint   Patient presents with    Diabetes    Hypertension    Hyperlipidemia        Presents today to follow up on chronic conditions. Overall is in good health with no complaints/:  HTN- Taking Iberstartan 300mg daily and carvedilol 6.25 twice daily.  Denies changes to vision hearing or worsening headaches     K1A-Ujtuayk metformin at the last visit and started Januvia. Taking glipizide 10mg and jardiance 10mg. She has yet to est with endo.    Last home BS:  After meals 150s  Checking BS: on occasion  Hypoglycemia: Denies  Foot/Eye exam: Eye exam up-to-date, f/w with ophthalmology every 2 mo for retinopathy .     Atrial Fib- F/w cardio annually, last visit in Dec 2024. Taking Eliquis 5mg BID. No med changes made at that time    HLD- Started on Zetia 10mg by cardiology unable to tolerate. She is using weight watchers which she started 6 wks ago and she has lost 10 lbs.           Medications reviewed and updated.    Current Outpatient Medications:     vitamin D (ERGOCALCIFEROL) 1.25 MG (80980 UT) CAPS capsule, Take 1 capsule by mouth once a week, Disp: 12 capsule, Rfl: 1    topiramate (TOPAMAX) 100 MG tablet, Take 1 tablet by mouth once daily, Disp: 90 tablet, Rfl: 0    irbesartan (AVAPRO) 300 MG tablet, Take 1 tablet by mouth once daily, Disp: 90 tablet, Rfl: 0    carvedilol (COREG) 6.25 MG tablet, Take 1 tablet by mouth twice daily, Disp: 180 tablet, Rfl: 0    JARDIANCE 10 MG tablet, Take 1 tablet by mouth once daily, Disp: 90 tablet, Rfl: 0    apixaban (ELIQUIS) 5 MG TABS tablet, Take 1 tablet by mouth 2 times daily, Disp: 180 tablet, Rfl: 2    glipiZIDE (GLUCOTROL XL) 10 MG extended release tablet, Take 1 tablet by mouth daily, Disp: 90 tablet, Rfl: 2    SITagliptin (JANUVIA) 25 MG tablet, Take 1 tablet by mouth daily, Disp: 90 tablet, Rfl: 2    blood glucose monitor strips, Use to check blood sugar twice daily,

## 2025-03-13 DIAGNOSIS — I48.91 ATRIAL FIBRILLATION, UNSPECIFIED TYPE (HCC): ICD-10-CM

## 2025-03-13 DIAGNOSIS — I10 ESSENTIAL (PRIMARY) HYPERTENSION: ICD-10-CM

## 2025-03-13 DIAGNOSIS — Z79.4 TYPE 2 DIABETES MELLITUS WITH HYPERGLYCEMIA, WITH LONG-TERM CURRENT USE OF INSULIN (HCC): ICD-10-CM

## 2025-03-13 DIAGNOSIS — G43.819 OTHER MIGRAINE WITHOUT STATUS MIGRAINOSUS, INTRACTABLE: ICD-10-CM

## 2025-03-13 DIAGNOSIS — E11.65 TYPE 2 DIABETES MELLITUS WITH HYPERGLYCEMIA, WITH LONG-TERM CURRENT USE OF INSULIN (HCC): ICD-10-CM

## 2025-03-14 NOTE — TELEPHONE ENCOUNTER
Last Appointment:  Visit date not found  Future Appointments   Date Time Provider Department Center   7/9/2025  7:45 AM [facility]   9/5/2025  7:30 AM VALDEMAR QUIÑONEZ BX RM 1 [facility]   9/23/2025  1:30 PM Tiana Tucker PA-C [facility] BS AMB

## 2025-03-17 RX ORDER — EMPAGLIFLOZIN 10 MG/1
10 TABLET, FILM COATED ORAL DAILY
Qty: 90 TABLET | Refills: 0 | Status: SHIPPED | OUTPATIENT
Start: 2025-03-17

## 2025-03-17 RX ORDER — CARVEDILOL 6.25 MG/1
6.25 TABLET ORAL 2 TIMES DAILY
Qty: 180 TABLET | Refills: 0 | Status: SHIPPED | OUTPATIENT
Start: 2025-03-17

## 2025-03-17 RX ORDER — IRBESARTAN 300 MG/1
300 TABLET ORAL DAILY
Qty: 90 TABLET | Refills: 0 | Status: SHIPPED | OUTPATIENT
Start: 2025-03-17

## 2025-03-17 RX ORDER — TOPIRAMATE 100 MG/1
100 TABLET, FILM COATED ORAL DAILY
Qty: 90 TABLET | Refills: 0 | Status: SHIPPED | OUTPATIENT
Start: 2025-03-17

## 2025-04-17 RX ORDER — EZETIMIBE 10 MG/1
10 TABLET ORAL DAILY
COMMUNITY
End: 2025-04-17

## 2025-04-18 RX ORDER — EZETIMIBE 10 MG/1
10 TABLET ORAL DAILY
Qty: 30 TABLET | Refills: 3 | Status: SHIPPED | OUTPATIENT
Start: 2025-04-18

## 2025-06-15 DIAGNOSIS — G43.819 OTHER MIGRAINE WITHOUT STATUS MIGRAINOSUS, INTRACTABLE: ICD-10-CM

## 2025-06-15 DIAGNOSIS — Z79.4 TYPE 2 DIABETES MELLITUS WITH HYPERGLYCEMIA, WITH LONG-TERM CURRENT USE OF INSULIN (HCC): ICD-10-CM

## 2025-06-15 DIAGNOSIS — I48.91 ATRIAL FIBRILLATION, UNSPECIFIED TYPE (HCC): ICD-10-CM

## 2025-06-15 DIAGNOSIS — E11.65 TYPE 2 DIABETES MELLITUS WITH HYPERGLYCEMIA, WITH LONG-TERM CURRENT USE OF INSULIN (HCC): ICD-10-CM

## 2025-06-15 DIAGNOSIS — I10 ESSENTIAL (PRIMARY) HYPERTENSION: ICD-10-CM

## 2025-06-16 RX ORDER — EMPAGLIFLOZIN 10 MG/1
10 TABLET, FILM COATED ORAL DAILY
Qty: 90 TABLET | Refills: 1 | Status: SHIPPED | OUTPATIENT
Start: 2025-06-16

## 2025-06-16 RX ORDER — TOPIRAMATE 100 MG/1
100 TABLET, FILM COATED ORAL DAILY
Qty: 90 TABLET | Refills: 1 | Status: SHIPPED | OUTPATIENT
Start: 2025-06-16

## 2025-06-16 RX ORDER — IRBESARTAN 300 MG/1
300 TABLET ORAL DAILY
Qty: 90 TABLET | Refills: 1 | Status: SHIPPED | OUTPATIENT
Start: 2025-06-16

## 2025-06-16 RX ORDER — CARVEDILOL 6.25 MG/1
6.25 TABLET ORAL 2 TIMES DAILY
Qty: 180 TABLET | Refills: 1 | Status: SHIPPED | OUTPATIENT
Start: 2025-06-16

## 2025-06-16 NOTE — TELEPHONE ENCOUNTER
Ms. Vaughan is requesting refills of:    Requested Prescriptions     Pending Prescriptions Disp Refills    carvedilol (COREG) 6.25 MG tablet [Pharmacy Med Name: Carvedilol 6.25 MG Oral Tablet] 180 tablet 0     Sig: Take 1 tablet by mouth twice daily    JARDIANCE 10 MG tablet [Pharmacy Med Name: Jardiance 10 MG Oral Tablet] 90 tablet 0     Sig: Take 1 tablet by mouth once daily    irbesartan (AVAPRO) 300 MG tablet [Pharmacy Med Name: Irbesartan 300 MG Oral Tablet] 90 tablet 0     Sig: Take 1 tablet by mouth once daily    topiramate (TOPAMAX) 100 MG tablet [Pharmacy Med Name: Topiramate 100 MG Oral Tablet] 90 tablet 0     Sig: Take 1 tablet by mouth once daily         to be sent to   [address], [address] [phone] - F [phone]  [address] [place] [address]  Phone: [phone] Fax: [phone]  .     LAST OFFICE VISIT:  3/7/2025     UPCOMING APPOINTMENT(S):  Future Appointments   Date Time Provider Department Center   7/9/2025  7:45 AM Georgina Salas DO [facility]   9/5/2025  7:30 AM [facility]   9/23/2025  1:30 PM Tiana Tucker PA-C [facility] BS [facility]         Provided notified

## 2025-07-09 ENCOUNTER — TELEMEDICINE (OUTPATIENT)
Facility: CLINIC | Age: 60
End: 2025-07-09
Payer: COMMERCIAL

## 2025-07-09 DIAGNOSIS — E78.2 MIXED HYPERLIPIDEMIA: ICD-10-CM

## 2025-07-09 DIAGNOSIS — I48.91 ATRIAL FIBRILLATION, UNSPECIFIED TYPE (HCC): ICD-10-CM

## 2025-07-09 DIAGNOSIS — E11.65 TYPE 2 DIABETES MELLITUS WITH HYPERGLYCEMIA, WITH LONG-TERM CURRENT USE OF INSULIN (HCC): Primary | ICD-10-CM

## 2025-07-09 DIAGNOSIS — G43.819 OTHER MIGRAINE WITHOUT STATUS MIGRAINOSUS, INTRACTABLE: ICD-10-CM

## 2025-07-09 DIAGNOSIS — R19.00 ABDOMINAL MASS, UNSPECIFIED ABDOMINAL LOCATION: ICD-10-CM

## 2025-07-09 DIAGNOSIS — E55.9 VITAMIN D DEFICIENCY, UNSPECIFIED: ICD-10-CM

## 2025-07-09 DIAGNOSIS — I10 ESSENTIAL (PRIMARY) HYPERTENSION: ICD-10-CM

## 2025-07-09 DIAGNOSIS — Z79.4 TYPE 2 DIABETES MELLITUS WITH HYPERGLYCEMIA, WITH LONG-TERM CURRENT USE OF INSULIN (HCC): Primary | ICD-10-CM

## 2025-07-09 PROCEDURE — 3044F HG A1C LEVEL LT 7.0%: CPT | Performed by: STUDENT IN AN ORGANIZED HEALTH CARE EDUCATION/TRAINING PROGRAM

## 2025-07-09 PROCEDURE — 99214 OFFICE O/P EST MOD 30 MIN: CPT | Performed by: STUDENT IN AN ORGANIZED HEALTH CARE EDUCATION/TRAINING PROGRAM

## 2025-07-09 RX ORDER — ERGOCALCIFEROL 1.25 MG/1
50000 CAPSULE, LIQUID FILLED ORAL WEEKLY
Qty: 12 CAPSULE | Refills: 1 | Status: SHIPPED | OUTPATIENT
Start: 2025-07-09

## 2025-07-09 RX ORDER — CARVEDILOL 6.25 MG/1
6.25 TABLET ORAL 2 TIMES DAILY
Qty: 180 TABLET | Refills: 1 | Status: SHIPPED | OUTPATIENT
Start: 2025-07-09

## 2025-07-09 RX ORDER — GLIPIZIDE 10 MG/1
10 TABLET, FILM COATED, EXTENDED RELEASE ORAL DAILY
Qty: 90 TABLET | Refills: 2 | Status: SHIPPED | OUTPATIENT
Start: 2025-07-09

## 2025-07-09 RX ORDER — IRBESARTAN 300 MG/1
300 TABLET ORAL DAILY
Qty: 90 TABLET | Refills: 1 | Status: SHIPPED | OUTPATIENT
Start: 2025-07-09

## 2025-07-09 RX ORDER — TOPIRAMATE 100 MG/1
100 TABLET, FILM COATED ORAL DAILY
Qty: 90 TABLET | Refills: 1 | Status: SHIPPED | OUTPATIENT
Start: 2025-07-09

## 2025-07-09 ASSESSMENT — PATIENT HEALTH QUESTIONNAIRE - PHQ9
1. LITTLE INTEREST OR PLEASURE IN DOING THINGS: NOT AT ALL
SUM OF ALL RESPONSES TO PHQ QUESTIONS 1-9: 0
2. FEELING DOWN, DEPRESSED OR HOPELESS: NOT AT ALL
SUM OF ALL RESPONSES TO PHQ QUESTIONS 1-9: 0

## 2025-07-09 ASSESSMENT — ENCOUNTER SYMPTOMS
ABDOMINAL PAIN: 0
SHORTNESS OF BREATH: 0

## 2025-07-09 NOTE — PROGRESS NOTES
Have you been to the ER, urgent care clinic since your last visit?  Hospitalized since your last visit?   NO    Have you seen or consulted any other health care providers outside our system since your last visit?   YES - When: approximately 8 days ago.  Where and Why: [facility].           
discussing the diagnosis and importance of compliance with the treatment plan as well as documenting on the day of the visit.    The patient (or guardian, if applicable) and other individuals in attendance with the patient were advised that Artificial Intelligence will be utilized during this visit to record, process the conversation to generate a clinical note and to support improvement of the AI technology. The patient (or guardian, if applicable) and other individuals in attendance at the appointment consented to the use of AI, including the recording.    --Georgina Salas, DO

## 2025-07-14 ENCOUNTER — OFFICE VISIT (OUTPATIENT)
Age: 60
End: 2025-07-14
Payer: COMMERCIAL

## 2025-07-14 VITALS
BODY MASS INDEX: 43.54 KG/M2 | DIASTOLIC BLOOD PRESSURE: 82 MMHG | TEMPERATURE: 97.5 F | OXYGEN SATURATION: 97 % | WEIGHT: 255 LBS | HEART RATE: 73 BPM | HEIGHT: 64 IN | SYSTOLIC BLOOD PRESSURE: 157 MMHG

## 2025-07-14 DIAGNOSIS — Z79.4 TYPE 2 DIABETES MELLITUS WITH RETINOPATHY AND MACULAR EDEMA, WITH LONG-TERM CURRENT USE OF INSULIN, UNSPECIFIED LATERALITY, UNSPECIFIED RETINOPATHY SEVERITY (HCC): ICD-10-CM

## 2025-07-14 DIAGNOSIS — E66.813 OBESITY, CLASS 3 (HCC): ICD-10-CM

## 2025-07-14 DIAGNOSIS — E11.311 TYPE 2 DIABETES MELLITUS WITH RETINOPATHY AND MACULAR EDEMA, WITH LONG-TERM CURRENT USE OF INSULIN, UNSPECIFIED LATERALITY, UNSPECIFIED RETINOPATHY SEVERITY (HCC): ICD-10-CM

## 2025-07-14 DIAGNOSIS — R22.2 ABDOMINAL WALL MASS: ICD-10-CM

## 2025-07-14 DIAGNOSIS — I48.91 ATRIAL FIBRILLATION, UNSPECIFIED TYPE (HCC): ICD-10-CM

## 2025-07-14 DIAGNOSIS — M79.89 SOFT TISSUE MASS: Primary | ICD-10-CM

## 2025-07-14 PROCEDURE — 99204 OFFICE O/P NEW MOD 45 MIN: CPT | Performed by: SURGERY

## 2025-07-14 PROCEDURE — 3044F HG A1C LEVEL LT 7.0%: CPT | Performed by: SURGERY

## 2025-07-14 NOTE — PROGRESS NOTES
Kalie Vaughan is a 60 y.o. female (: 1965) presenting to address:    Chief Complaint   Patient presents with    New Patient     RLQ abd wall mass/referred by Dr. Georgina Salas       Medication list and allergies have been reviewed with Kalie Vaughan and updated as of today's date.     I have gone over all Medical, Surgical and Social History with Kalie Vaughan and updated/added the information accordingly.

## 2025-07-14 NOTE — PROGRESS NOTES
General Surgery Consult    Kalie Vaughan  Admit date: (Not on file)    MRN: [id number]     : 1965     Age: 60 y.o.        Attending Physician: Zahra Leon MD [facility]      History of Present Illness:      Kalie Vaughan is a 60 y.o. female who was referred to be by Dr. Georgina Salas for evaluation of abdominal wall mass.  The patient is here today with her daughter and she stated that she noticed this mass for about 2 months now.  Interestingly she had bruises on the right lower quadrant that had been 4 or 5 times over the past 5 to 6 years but she never noticed any bulge or mass and this the first time that she noticed a bruise and a bulge as well.  She denies any trauma or any accident.  She had a hysterectomy in the past but no other abdominal surgeries.  She had an ultrasound placed but is not done yet and is scheduled for this Thursday.    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, with long-term current use of insulin, unspecified laterality, unspecified retinopathy severity ([facility]) 2025    Obesity, class 3 (E66.813) 2025    Body mass index [BMI] 40.0-44.9, adult (Z68.41) 2025    Migraine syndrome 2022    Vertigo 2022    History of atrial fibrillation 2022    History of lupus anticoagulant disorder 2022    Right sided weakness 2022    Headache 2022     Past Medical History:   Diagnosis Date    Atrial fibrillation (HCC)     S/P Ablatioin in 2016 at [facility]    Claustrophobia 2022    Patient requesting Full Sedation for MRI    CVA (cerebral vascular accident) (HCC)     Diabetes mellitus, type 2 ([facility])     Family history of early CAD     Father at 44, Brother at 39    GERD (gastroesophageal reflux disease)     Goiter     H/O lupus anticoagulant disorder     HLD (hyperlipidemia)     HTN

## 2025-07-17 ENCOUNTER — HOSPITAL ENCOUNTER (OUTPATIENT)
Facility: HOSPITAL | Age: 60
Discharge: HOME | End: 2025-07-20
Attending: STUDENT IN AN ORGANIZED HEALTH CARE EDUCATION/TRAINING PROGRAM
Payer: COMMERCIAL

## 2025-07-17 DIAGNOSIS — R19.00 ABDOMINAL MASS, UNSPECIFIED ABDOMINAL LOCATION: ICD-10-CM

## 2025-07-17 PROCEDURE — 76705 ECHO EXAM OF ABDOMEN: CPT

## 2025-07-21 ENCOUNTER — HOSPITAL ENCOUNTER (OUTPATIENT)
Age: 60
Discharge: HOME | End: 2025-07-24
Attending: SURGERY
Payer: COMMERCIAL

## 2025-07-21 DIAGNOSIS — M79.89 SOFT TISSUE MASS: ICD-10-CM

## 2025-07-21 DIAGNOSIS — R22.2 ABDOMINAL WALL MASS: ICD-10-CM

## 2025-07-21 PROCEDURE — 74176 CT ABD & PELVIS W/O CONTRAST: CPT

## 2025-07-24 ENCOUNTER — TELEPHONE (OUTPATIENT)
Age: 60
End: 2025-07-24

## 2025-07-24 NOTE — TELEPHONE ENCOUNTER
Left voicemail message to contact our office to schedule an appointment with Dr. Leon to discuss CT results.

## 2025-07-28 ENCOUNTER — OFFICE VISIT (OUTPATIENT)
Age: 60
End: 2025-07-28
Payer: COMMERCIAL

## 2025-07-28 VITALS
HEIGHT: 64 IN | OXYGEN SATURATION: 97 % | BODY MASS INDEX: 44.56 KG/M2 | HEART RATE: 78 BPM | WEIGHT: 261 LBS | SYSTOLIC BLOOD PRESSURE: 151 MMHG | DIASTOLIC BLOOD PRESSURE: 74 MMHG

## 2025-07-28 DIAGNOSIS — E11.311 TYPE 2 DIABETES MELLITUS WITH RETINOPATHY AND MACULAR EDEMA, WITH LONG-TERM CURRENT USE OF INSULIN, UNSPECIFIED LATERALITY, UNSPECIFIED RETINOPATHY SEVERITY (HCC): ICD-10-CM

## 2025-07-28 DIAGNOSIS — Z79.4 TYPE 2 DIABETES MELLITUS WITH RETINOPATHY AND MACULAR EDEMA, WITH LONG-TERM CURRENT USE OF INSULIN, UNSPECIFIED LATERALITY, UNSPECIFIED RETINOPATHY SEVERITY (HCC): ICD-10-CM

## 2025-07-28 DIAGNOSIS — E66.813 OBESITY, CLASS 3 (HCC): ICD-10-CM

## 2025-07-28 DIAGNOSIS — I48.91 ATRIAL FIBRILLATION, UNSPECIFIED TYPE (HCC): ICD-10-CM

## 2025-07-28 DIAGNOSIS — M79.89 SOFT TISSUE MASS: ICD-10-CM

## 2025-07-28 DIAGNOSIS — R22.2 ABDOMINAL WALL MASS: Primary | ICD-10-CM

## 2025-07-28 PROCEDURE — 99214 OFFICE O/P EST MOD 30 MIN: CPT | Performed by: SURGERY

## 2025-07-28 PROCEDURE — 3044F HG A1C LEVEL LT 7.0%: CPT | Performed by: SURGERY

## 2025-07-28 NOTE — PROGRESS NOTES
General Surgery Consult    Kalie Vaughan  Admit date: (Not on file)    MRN: [id number]     : 1965     Age: 60 y.o.        Attending Physician: Zahra Leon MD, [facility]      History of Present Illness:      Kalie Vaughan is a 60 y.o. female who is here with her daughter for follow-up on her abdominal wall mass.  I have seen the patient before for evaluation of abdominal wall mass that is located on the right side.  Also the patient had some bruising and because of the mass and the bruising we got a CT scan and ultrasound that was placed already.  Both showed a soft tissue mass consistent with fat necrosis but no tumor or no hernias.  The patient is doing relatively well and she stated that the bruising is gone and she feels well with no pain or discomfort.    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, with long-term current use of insulin, unspecified laterality, unspecified retinopathy severity ([facility]) 2025    Obesity, class 3 (E66.813) 2025    Body mass index [BMI] 40.0-44.9, adult (Z68.41) 2025    Migraine syndrome 2022    Vertigo 2022    History of atrial fibrillation 2022    History of lupus anticoagulant disorder 2022    Right sided weakness 2022    Headache 2022     Past Medical History:   Diagnosis Date    Atrial fibrillation ([facility])     S/P Ablatioin in 2016 at [facility]    Claustrophobia 2022    Patient requesting Full Sedation for MRI    CVA (cerebral vascular accident) (HCC)     Diabetes mellitus, type 2 (HCC)     Family history of early CAD     Father at 44, Brother at 39    GERD (gastroesophageal reflux disease)     Goiter     H/O lupus anticoagulant disorder     HLD (hyperlipidemia)     HTN (hypertension)     Migraines     Reportedly without Aura    BOB (nonalcoholic

## 2025-07-28 NOTE — PROGRESS NOTES
Kalie Vaughan is a 60 y.o. female (: 1965) presenting to address:    Chief Complaint   Patient presents with    Follow-up     Discuss US abd ascencio 25 and Ct abd Pelv 25       Medication list and allergies have been reviewed with Kalie Vaughan and updated as of today's date.     I have gone over all Medical, Surgical and Social History with Kalie Vaughan and updated/added the information accordingly.       1. Have you been to the ER, Urgent Care or Hospitalized since your last visit? No          2. Have you followed up with your PCP or any other Physicians since your procedure/ last office visit?   No

## 2025-09-05 ENCOUNTER — HOSPITAL ENCOUNTER (OUTPATIENT)
Dept: WOMENS IMAGING | Facility: HOSPITAL | Age: 60
Discharge: HOME | End: 2025-09-05
Attending: STUDENT IN AN ORGANIZED HEALTH CARE EDUCATION/TRAINING PROGRAM
Payer: COMMERCIAL

## 2025-09-05 DIAGNOSIS — Z12.31 ENCOUNTER FOR SCREENING MAMMOGRAM FOR MALIGNANT NEOPLASM OF BREAST: ICD-10-CM

## 2025-09-05 PROCEDURE — 77067 SCR MAMMO BI INCL CAD: CPT
